# Patient Record
Sex: FEMALE | Race: WHITE | NOT HISPANIC OR LATINO | Employment: STUDENT | URBAN - METROPOLITAN AREA
[De-identification: names, ages, dates, MRNs, and addresses within clinical notes are randomized per-mention and may not be internally consistent; named-entity substitution may affect disease eponyms.]

---
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
## 2021-05-22 ENCOUNTER — IMMUNIZATION (OUTPATIENT)
Dept: PRIMARY CARE CLINIC | Facility: CLINIC | Age: 21
End: 2021-05-22
Payer: COMMERCIAL

## 2021-05-22 DIAGNOSIS — Z23 NEED FOR VACCINATION: Primary | ICD-10-CM

## 2021-05-22 PROCEDURE — 0001A COVID-19, MRNA, LNP-S, PF, 30 MCG/0.3 ML DOSE VACCINE: ICD-10-PCS | Mod: CV19,S$GLB,, | Performed by: INTERNAL MEDICINE

## 2021-05-22 PROCEDURE — 91300 COVID-19, MRNA, LNP-S, PF, 30 MCG/0.3 ML DOSE VACCINE: CPT | Mod: S$GLB,,, | Performed by: INTERNAL MEDICINE

## 2021-05-22 PROCEDURE — 91300 COVID-19, MRNA, LNP-S, PF, 30 MCG/0.3 ML DOSE VACCINE: ICD-10-PCS | Mod: S$GLB,,, | Performed by: INTERNAL MEDICINE

## 2021-05-22 PROCEDURE — 0001A COVID-19, MRNA, LNP-S, PF, 30 MCG/0.3 ML DOSE VACCINE: CPT | Mod: CV19,S$GLB,, | Performed by: INTERNAL MEDICINE

## 2021-06-12 ENCOUNTER — IMMUNIZATION (OUTPATIENT)
Dept: PRIMARY CARE CLINIC | Facility: CLINIC | Age: 21
End: 2021-06-12
Payer: COMMERCIAL

## 2021-06-12 DIAGNOSIS — Z23 NEED FOR VACCINATION: Primary | ICD-10-CM

## 2021-06-12 PROCEDURE — 0002A COVID-19, MRNA, LNP-S, PF, 30 MCG/0.3 ML DOSE VACCINE: CPT | Mod: CV19,S$GLB,, | Performed by: INTERNAL MEDICINE

## 2021-06-12 PROCEDURE — 91300 COVID-19, MRNA, LNP-S, PF, 30 MCG/0.3 ML DOSE VACCINE: CPT | Mod: S$GLB,,, | Performed by: INTERNAL MEDICINE

## 2021-06-12 PROCEDURE — 0002A COVID-19, MRNA, LNP-S, PF, 30 MCG/0.3 ML DOSE VACCINE: ICD-10-PCS | Mod: CV19,S$GLB,, | Performed by: INTERNAL MEDICINE

## 2021-06-12 PROCEDURE — 91300 COVID-19, MRNA, LNP-S, PF, 30 MCG/0.3 ML DOSE VACCINE: ICD-10-PCS | Mod: S$GLB,,, | Performed by: INTERNAL MEDICINE

## 2021-07-13 ENCOUNTER — LAB VISIT (OUTPATIENT)
Dept: LAB | Facility: HOSPITAL | Age: 21
End: 2021-07-13
Attending: INTERNAL MEDICINE
Payer: COMMERCIAL

## 2021-07-13 ENCOUNTER — OFFICE VISIT (OUTPATIENT)
Dept: FAMILY MEDICINE | Facility: CLINIC | Age: 21
End: 2021-07-13
Payer: COMMERCIAL

## 2021-07-13 VITALS
DIASTOLIC BLOOD PRESSURE: 70 MMHG | BODY MASS INDEX: 43 KG/M2 | HEIGHT: 62 IN | WEIGHT: 233.69 LBS | OXYGEN SATURATION: 98 % | TEMPERATURE: 99 F | HEART RATE: 102 BPM | SYSTOLIC BLOOD PRESSURE: 130 MMHG

## 2021-07-13 DIAGNOSIS — E28.2 PCOS (POLYCYSTIC OVARIAN SYNDROME): ICD-10-CM

## 2021-07-13 DIAGNOSIS — D50.9 IRON DEFICIENCY ANEMIA, UNSPECIFIED IRON DEFICIENCY ANEMIA TYPE: ICD-10-CM

## 2021-07-13 DIAGNOSIS — Z11.4 ENCOUNTER FOR SCREENING FOR HIV: ICD-10-CM

## 2021-07-13 DIAGNOSIS — R73.09 OTHER ABNORMAL GLUCOSE: ICD-10-CM

## 2021-07-13 DIAGNOSIS — R06.83 SNORING: ICD-10-CM

## 2021-07-13 DIAGNOSIS — G89.29 CHRONIC INTRACTABLE HEADACHE, UNSPECIFIED HEADACHE TYPE: ICD-10-CM

## 2021-07-13 DIAGNOSIS — G89.29 CHRONIC INTRACTABLE HEADACHE, UNSPECIFIED HEADACHE TYPE: Primary | ICD-10-CM

## 2021-07-13 DIAGNOSIS — R51.9 CHRONIC INTRACTABLE HEADACHE, UNSPECIFIED HEADACHE TYPE: ICD-10-CM

## 2021-07-13 DIAGNOSIS — R51.9 CHRONIC INTRACTABLE HEADACHE, UNSPECIFIED HEADACHE TYPE: Primary | ICD-10-CM

## 2021-07-13 LAB
ALBUMIN SERPL BCP-MCNC: 3.9 G/DL (ref 3.5–5.2)
ALP SERPL-CCNC: 46 U/L (ref 55–135)
ALT SERPL W/O P-5'-P-CCNC: 20 U/L (ref 10–44)
ANION GAP SERPL CALC-SCNC: 9 MMOL/L (ref 8–16)
AST SERPL-CCNC: 18 U/L (ref 10–40)
BILIRUB SERPL-MCNC: 0.2 MG/DL (ref 0.1–1)
BUN SERPL-MCNC: 8 MG/DL (ref 6–20)
CALCIUM SERPL-MCNC: 9.5 MG/DL (ref 8.7–10.5)
CHLORIDE SERPL-SCNC: 107 MMOL/L (ref 95–110)
CO2 SERPL-SCNC: 23 MMOL/L (ref 23–29)
CREAT SERPL-MCNC: 0.8 MG/DL (ref 0.5–1.4)
EST. GFR  (AFRICAN AMERICAN): >60 ML/MIN/1.73 M^2
EST. GFR  (NON AFRICAN AMERICAN): >60 ML/MIN/1.73 M^2
ESTIMATED AVG GLUCOSE: 103 MG/DL (ref 68–131)
FERRITIN SERPL-MCNC: 12 NG/ML (ref 20–300)
GLUCOSE SERPL-MCNC: 86 MG/DL (ref 70–110)
HBA1C MFR BLD: 5.2 % (ref 4–5.6)
IRON SERPL-MCNC: 51 UG/DL (ref 30–160)
POTASSIUM SERPL-SCNC: 4.4 MMOL/L (ref 3.5–5.1)
PROT SERPL-MCNC: 7.5 G/DL (ref 6–8.4)
SATURATED IRON: 11 % (ref 20–50)
SODIUM SERPL-SCNC: 139 MMOL/L (ref 136–145)
T4 FREE SERPL-MCNC: 1.03 NG/DL (ref 0.71–1.51)
TOTAL IRON BINDING CAPACITY: 463 UG/DL (ref 250–450)
TRANSFERRIN SERPL-MCNC: 313 MG/DL (ref 200–375)
TSH SERPL DL<=0.005 MIU/L-ACNC: 4.45 UIU/ML (ref 0.4–4)

## 2021-07-13 PROCEDURE — 82728 ASSAY OF FERRITIN: CPT | Performed by: INTERNAL MEDICINE

## 2021-07-13 PROCEDURE — 87389 HIV-1 AG W/HIV-1&-2 AB AG IA: CPT | Performed by: INTERNAL MEDICINE

## 2021-07-13 PROCEDURE — 1126F AMNT PAIN NOTED NONE PRSNT: CPT | Mod: S$GLB,,, | Performed by: INTERNAL MEDICINE

## 2021-07-13 PROCEDURE — 1126F PR PAIN SEVERITY QUANTIFIED, NO PAIN PRESENT: ICD-10-PCS | Mod: S$GLB,,, | Performed by: INTERNAL MEDICINE

## 2021-07-13 PROCEDURE — 99214 PR OFFICE/OUTPT VISIT, EST, LEVL IV, 30-39 MIN: ICD-10-PCS | Mod: S$GLB,,, | Performed by: INTERNAL MEDICINE

## 2021-07-13 PROCEDURE — 80053 COMPREHEN METABOLIC PANEL: CPT | Performed by: INTERNAL MEDICINE

## 2021-07-13 PROCEDURE — 83540 ASSAY OF IRON: CPT | Performed by: INTERNAL MEDICINE

## 2021-07-13 PROCEDURE — 84439 ASSAY OF FREE THYROXINE: CPT | Performed by: INTERNAL MEDICINE

## 2021-07-13 PROCEDURE — 83036 HEMOGLOBIN GLYCOSYLATED A1C: CPT | Performed by: INTERNAL MEDICINE

## 2021-07-13 PROCEDURE — 36415 COLL VENOUS BLD VENIPUNCTURE: CPT | Mod: PO | Performed by: INTERNAL MEDICINE

## 2021-07-13 PROCEDURE — 99999 PR PBB SHADOW E&M-EST. PATIENT-LVL IV: ICD-10-PCS | Mod: PBBFAC,,, | Performed by: INTERNAL MEDICINE

## 2021-07-13 PROCEDURE — 84443 ASSAY THYROID STIM HORMONE: CPT | Performed by: INTERNAL MEDICINE

## 2021-07-13 PROCEDURE — 99214 OFFICE O/P EST MOD 30 MIN: CPT | Mod: S$GLB,,, | Performed by: INTERNAL MEDICINE

## 2021-07-13 PROCEDURE — 99999 PR PBB SHADOW E&M-EST. PATIENT-LVL IV: CPT | Mod: PBBFAC,,, | Performed by: INTERNAL MEDICINE

## 2021-07-13 PROCEDURE — 3008F BODY MASS INDEX DOCD: CPT | Mod: CPTII,S$GLB,, | Performed by: INTERNAL MEDICINE

## 2021-07-13 PROCEDURE — 3008F PR BODY MASS INDEX (BMI) DOCUMENTED: ICD-10-PCS | Mod: CPTII,S$GLB,, | Performed by: INTERNAL MEDICINE

## 2021-07-13 PROCEDURE — 85025 COMPLETE CBC W/AUTO DIFF WBC: CPT | Performed by: INTERNAL MEDICINE

## 2021-07-13 RX ORDER — FLUORIDE (SODIUM) 1.1 %
PASTE (ML) DENTAL
COMMUNITY
Start: 2021-05-25 | End: 2022-02-07

## 2021-07-14 DIAGNOSIS — E61.1 IRON DEFICIENCY: ICD-10-CM

## 2021-07-14 DIAGNOSIS — R79.89 ABNORMAL TSH: Primary | ICD-10-CM

## 2021-07-14 LAB
BASOPHILS # BLD AUTO: 0.03 K/UL (ref 0–0.2)
BASOPHILS NFR BLD: 0.5 % (ref 0–1.9)
DIFFERENTIAL METHOD: ABNORMAL
EOSINOPHIL # BLD AUTO: 0.1 K/UL (ref 0–0.5)
EOSINOPHIL NFR BLD: 1.1 % (ref 0–8)
ERYTHROCYTE [DISTWIDTH] IN BLOOD BY AUTOMATED COUNT: 14.2 % (ref 11.5–14.5)
HCT VFR BLD AUTO: 41.1 % (ref 37–48.5)
HGB BLD-MCNC: 13 G/DL (ref 12–16)
IMM GRANULOCYTES # BLD AUTO: 0.02 K/UL (ref 0–0.04)
IMM GRANULOCYTES NFR BLD AUTO: 0.4 % (ref 0–0.5)
LYMPHOCYTES # BLD AUTO: 2.5 K/UL (ref 1–4.8)
LYMPHOCYTES NFR BLD: 45.4 % (ref 18–48)
MCH RBC QN AUTO: 27.3 PG (ref 27–31)
MCHC RBC AUTO-ENTMCNC: 31.6 G/DL (ref 32–36)
MCV RBC AUTO: 86 FL (ref 82–98)
MONOCYTES # BLD AUTO: 0.5 K/UL (ref 0.3–1)
MONOCYTES NFR BLD: 8.3 % (ref 4–15)
NEUTROPHILS # BLD AUTO: 2.5 K/UL (ref 1.8–7.7)
NEUTROPHILS NFR BLD: 44.3 % (ref 38–73)
NRBC BLD-RTO: 0 /100 WBC
PLATELET # BLD AUTO: 273 K/UL (ref 150–450)
PMV BLD AUTO: 10.3 FL (ref 9.2–12.9)
RBC # BLD AUTO: 4.76 M/UL (ref 4–5.4)
WBC # BLD AUTO: 5.55 K/UL (ref 3.9–12.7)

## 2021-07-16 LAB — HIV 1+2 AB+HIV1 P24 AG SERPL QL IA: NEGATIVE

## 2021-08-05 ENCOUNTER — HOSPITAL ENCOUNTER (OUTPATIENT)
Dept: SLEEP MEDICINE | Facility: HOSPITAL | Age: 21
Discharge: HOME | End: 2021-08-05
Attending: INTERNAL MEDICINE
Payer: COMMERCIAL

## 2021-08-05 DIAGNOSIS — R06.83 SNORING: ICD-10-CM

## 2021-08-05 PROCEDURE — 95800 SLP STDY UNATTENDED: CPT

## 2021-08-05 PROCEDURE — 95800 SLP STDY UNATTENDED: CPT | Mod: 26,,, | Performed by: INTERNAL MEDICINE

## 2021-08-05 PROCEDURE — 95800 PR SLEEP STUDY, UNATTENDED, RECORD HEART RATE/O2 SAT/RESP ANAL/SLEEP TIME: ICD-10-PCS | Mod: 26,,, | Performed by: INTERNAL MEDICINE

## 2021-08-16 ENCOUNTER — OFFICE VISIT (OUTPATIENT)
Dept: OBSTETRICS AND GYNECOLOGY | Facility: CLINIC | Age: 21
End: 2021-08-16
Payer: COMMERCIAL

## 2021-08-16 VITALS
DIASTOLIC BLOOD PRESSURE: 68 MMHG | WEIGHT: 232.13 LBS | BODY MASS INDEX: 42.72 KG/M2 | SYSTOLIC BLOOD PRESSURE: 124 MMHG | HEIGHT: 62 IN

## 2021-08-16 DIAGNOSIS — E28.2 PCOS (POLYCYSTIC OVARIAN SYNDROME): ICD-10-CM

## 2021-08-16 DIAGNOSIS — Z01.411 ENCOUNTER FOR WELL WOMAN EXAM WITH ABNORMAL FINDINGS: Primary | ICD-10-CM

## 2021-08-16 PROCEDURE — 99999 PR PBB SHADOW E&M-EST. PATIENT-LVL III: CPT | Mod: PBBFAC,,, | Performed by: OBSTETRICS & GYNECOLOGY

## 2021-08-16 PROCEDURE — 3044F HG A1C LEVEL LT 7.0%: CPT | Mod: CPTII,S$GLB,, | Performed by: OBSTETRICS & GYNECOLOGY

## 2021-08-16 PROCEDURE — 99395 PR PREVENTIVE VISIT,EST,18-39: ICD-10-PCS | Mod: S$GLB,,, | Performed by: OBSTETRICS & GYNECOLOGY

## 2021-08-16 PROCEDURE — 1126F AMNT PAIN NOTED NONE PRSNT: CPT | Mod: CPTII,S$GLB,, | Performed by: OBSTETRICS & GYNECOLOGY

## 2021-08-16 PROCEDURE — 3044F PR MOST RECENT HEMOGLOBIN A1C LEVEL <7.0%: ICD-10-PCS | Mod: CPTII,S$GLB,, | Performed by: OBSTETRICS & GYNECOLOGY

## 2021-08-16 PROCEDURE — 1126F PR PAIN SEVERITY QUANTIFIED, NO PAIN PRESENT: ICD-10-PCS | Mod: CPTII,S$GLB,, | Performed by: OBSTETRICS & GYNECOLOGY

## 2021-08-16 PROCEDURE — 1159F MED LIST DOCD IN RCRD: CPT | Mod: CPTII,S$GLB,, | Performed by: OBSTETRICS & GYNECOLOGY

## 2021-08-16 PROCEDURE — 3078F DIAST BP <80 MM HG: CPT | Mod: CPTII,S$GLB,, | Performed by: OBSTETRICS & GYNECOLOGY

## 2021-08-16 PROCEDURE — 3008F BODY MASS INDEX DOCD: CPT | Mod: CPTII,S$GLB,, | Performed by: OBSTETRICS & GYNECOLOGY

## 2021-08-16 PROCEDURE — 3074F SYST BP LT 130 MM HG: CPT | Mod: CPTII,S$GLB,, | Performed by: OBSTETRICS & GYNECOLOGY

## 2021-08-16 PROCEDURE — 99999 PR PBB SHADOW E&M-EST. PATIENT-LVL III: ICD-10-PCS | Mod: PBBFAC,,, | Performed by: OBSTETRICS & GYNECOLOGY

## 2021-08-16 PROCEDURE — 1159F PR MEDICATION LIST DOCUMENTED IN MEDICAL RECORD: ICD-10-PCS | Mod: CPTII,S$GLB,, | Performed by: OBSTETRICS & GYNECOLOGY

## 2021-08-16 PROCEDURE — 3078F PR MOST RECENT DIASTOLIC BLOOD PRESSURE < 80 MM HG: ICD-10-PCS | Mod: CPTII,S$GLB,, | Performed by: OBSTETRICS & GYNECOLOGY

## 2021-08-16 PROCEDURE — 3074F PR MOST RECENT SYSTOLIC BLOOD PRESSURE < 130 MM HG: ICD-10-PCS | Mod: CPTII,S$GLB,, | Performed by: OBSTETRICS & GYNECOLOGY

## 2021-08-16 PROCEDURE — 99395 PREV VISIT EST AGE 18-39: CPT | Mod: S$GLB,,, | Performed by: OBSTETRICS & GYNECOLOGY

## 2021-08-16 PROCEDURE — 3008F PR BODY MASS INDEX (BMI) DOCUMENTED: ICD-10-PCS | Mod: CPTII,S$GLB,, | Performed by: OBSTETRICS & GYNECOLOGY

## 2021-08-16 RX ORDER — NORGESTIMATE AND ETHINYL ESTRADIOL 0.25-0.035
1 KIT ORAL DAILY
Qty: 28 TABLET | Refills: 5 | Status: SHIPPED | OUTPATIENT
Start: 2021-08-16 | End: 2021-11-06 | Stop reason: SDUPTHER

## 2021-08-18 DIAGNOSIS — G44.52 NEW DAILY PERSISTENT HEADACHE: ICD-10-CM

## 2021-08-18 PROBLEM — G44.89 OTHER HEADACHE SYNDROME: Status: ACTIVE | Noted: 2019-05-28

## 2021-10-26 ENCOUNTER — PATIENT OUTREACH (OUTPATIENT)
Dept: ADMINISTRATIVE | Facility: OTHER | Age: 21
End: 2021-10-26

## 2021-10-27 ENCOUNTER — OFFICE VISIT (OUTPATIENT)
Dept: NEUROLOGY | Facility: CLINIC | Age: 21
End: 2021-10-27
Payer: COMMERCIAL

## 2021-10-27 VITALS
BODY MASS INDEX: 42.96 KG/M2 | DIASTOLIC BLOOD PRESSURE: 83 MMHG | HEART RATE: 94 BPM | SYSTOLIC BLOOD PRESSURE: 139 MMHG | HEIGHT: 62 IN | WEIGHT: 233.44 LBS

## 2021-10-27 DIAGNOSIS — G43.009 MIGRAINE WITHOUT AURA AND WITHOUT STATUS MIGRAINOSUS, NOT INTRACTABLE: Primary | ICD-10-CM

## 2021-10-27 PROCEDURE — 99999 PR PBB SHADOW E&M-EST. PATIENT-LVL III: ICD-10-PCS | Mod: PBBFAC,,, | Performed by: PSYCHIATRY & NEUROLOGY

## 2021-10-27 PROCEDURE — 3008F PR BODY MASS INDEX (BMI) DOCUMENTED: ICD-10-PCS | Mod: CPTII,S$GLB,, | Performed by: PSYCHIATRY & NEUROLOGY

## 2021-10-27 PROCEDURE — 3079F PR MOST RECENT DIASTOLIC BLOOD PRESSURE 80-89 MM HG: ICD-10-PCS | Mod: CPTII,S$GLB,, | Performed by: PSYCHIATRY & NEUROLOGY

## 2021-10-27 PROCEDURE — 1160F PR REVIEW ALL MEDS BY PRESCRIBER/CLIN PHARMACIST DOCUMENTED: ICD-10-PCS | Mod: CPTII,S$GLB,, | Performed by: PSYCHIATRY & NEUROLOGY

## 2021-10-27 PROCEDURE — 99205 PR OFFICE/OUTPT VISIT, NEW, LEVL V, 60-74 MIN: ICD-10-PCS | Mod: S$GLB,,, | Performed by: PSYCHIATRY & NEUROLOGY

## 2021-10-27 PROCEDURE — 3075F PR MOST RECENT SYSTOLIC BLOOD PRESS GE 130-139MM HG: ICD-10-PCS | Mod: CPTII,S$GLB,, | Performed by: PSYCHIATRY & NEUROLOGY

## 2021-10-27 PROCEDURE — 3008F BODY MASS INDEX DOCD: CPT | Mod: CPTII,S$GLB,, | Performed by: PSYCHIATRY & NEUROLOGY

## 2021-10-27 PROCEDURE — 3044F HG A1C LEVEL LT 7.0%: CPT | Mod: CPTII,S$GLB,, | Performed by: PSYCHIATRY & NEUROLOGY

## 2021-10-27 PROCEDURE — 3075F SYST BP GE 130 - 139MM HG: CPT | Mod: CPTII,S$GLB,, | Performed by: PSYCHIATRY & NEUROLOGY

## 2021-10-27 PROCEDURE — 3079F DIAST BP 80-89 MM HG: CPT | Mod: CPTII,S$GLB,, | Performed by: PSYCHIATRY & NEUROLOGY

## 2021-10-27 PROCEDURE — 1160F RVW MEDS BY RX/DR IN RCRD: CPT | Mod: CPTII,S$GLB,, | Performed by: PSYCHIATRY & NEUROLOGY

## 2021-10-27 PROCEDURE — 1159F PR MEDICATION LIST DOCUMENTED IN MEDICAL RECORD: ICD-10-PCS | Mod: CPTII,S$GLB,, | Performed by: PSYCHIATRY & NEUROLOGY

## 2021-10-27 PROCEDURE — 1159F MED LIST DOCD IN RCRD: CPT | Mod: CPTII,S$GLB,, | Performed by: PSYCHIATRY & NEUROLOGY

## 2021-10-27 PROCEDURE — 3044F PR MOST RECENT HEMOGLOBIN A1C LEVEL <7.0%: ICD-10-PCS | Mod: CPTII,S$GLB,, | Performed by: PSYCHIATRY & NEUROLOGY

## 2021-10-27 PROCEDURE — 99205 OFFICE O/P NEW HI 60 MIN: CPT | Mod: S$GLB,,, | Performed by: PSYCHIATRY & NEUROLOGY

## 2021-10-27 PROCEDURE — 99999 PR PBB SHADOW E&M-EST. PATIENT-LVL III: CPT | Mod: PBBFAC,,, | Performed by: PSYCHIATRY & NEUROLOGY

## 2021-10-27 RX ORDER — RIZATRIPTAN BENZOATE 10 MG/1
10 TABLET, ORALLY DISINTEGRATING ORAL
Qty: 9 TABLET | Refills: 5 | Status: SHIPPED | OUTPATIENT
Start: 2021-10-27 | End: 2021-12-08

## 2021-10-27 RX ORDER — TOPIRAMATE 25 MG/1
TABLET ORAL
Qty: 141 TABLET | Refills: 0 | Status: SHIPPED | OUTPATIENT
Start: 2021-10-27 | End: 2021-11-22

## 2021-11-15 ENCOUNTER — OFFICE VISIT (OUTPATIENT)
Dept: OBSTETRICS AND GYNECOLOGY | Facility: CLINIC | Age: 21
End: 2021-11-15
Payer: COMMERCIAL

## 2021-11-15 VITALS — BODY MASS INDEX: 43.35 KG/M2 | DIASTOLIC BLOOD PRESSURE: 90 MMHG | WEIGHT: 237 LBS | SYSTOLIC BLOOD PRESSURE: 142 MMHG

## 2021-11-15 DIAGNOSIS — E28.2 PCOS (POLYCYSTIC OVARIAN SYNDROME): ICD-10-CM

## 2021-11-15 DIAGNOSIS — Z30.41 ORAL CONTRACEPTIVE PILL SURVEILLANCE: Primary | ICD-10-CM

## 2021-11-15 DIAGNOSIS — R03.0 ELEVATED BP WITHOUT DIAGNOSIS OF HYPERTENSION: ICD-10-CM

## 2021-11-15 PROCEDURE — 1159F MED LIST DOCD IN RCRD: CPT | Mod: CPTII,S$GLB,, | Performed by: OBSTETRICS & GYNECOLOGY

## 2021-11-15 PROCEDURE — 3077F PR MOST RECENT SYSTOLIC BLOOD PRESSURE >= 140 MM HG: ICD-10-PCS | Mod: CPTII,S$GLB,, | Performed by: OBSTETRICS & GYNECOLOGY

## 2021-11-15 PROCEDURE — 3008F BODY MASS INDEX DOCD: CPT | Mod: CPTII,S$GLB,, | Performed by: OBSTETRICS & GYNECOLOGY

## 2021-11-15 PROCEDURE — 3044F HG A1C LEVEL LT 7.0%: CPT | Mod: CPTII,S$GLB,, | Performed by: OBSTETRICS & GYNECOLOGY

## 2021-11-15 PROCEDURE — 99213 PR OFFICE/OUTPT VISIT, EST, LEVL III, 20-29 MIN: ICD-10-PCS | Mod: S$GLB,,, | Performed by: OBSTETRICS & GYNECOLOGY

## 2021-11-15 PROCEDURE — 99999 PR PBB SHADOW E&M-EST. PATIENT-LVL III: CPT | Mod: PBBFAC,,, | Performed by: OBSTETRICS & GYNECOLOGY

## 2021-11-15 PROCEDURE — 99213 OFFICE O/P EST LOW 20 MIN: CPT | Mod: S$GLB,,, | Performed by: OBSTETRICS & GYNECOLOGY

## 2021-11-15 PROCEDURE — 3077F SYST BP >= 140 MM HG: CPT | Mod: CPTII,S$GLB,, | Performed by: OBSTETRICS & GYNECOLOGY

## 2021-11-15 PROCEDURE — 3008F PR BODY MASS INDEX (BMI) DOCUMENTED: ICD-10-PCS | Mod: CPTII,S$GLB,, | Performed by: OBSTETRICS & GYNECOLOGY

## 2021-11-15 PROCEDURE — 99999 PR PBB SHADOW E&M-EST. PATIENT-LVL III: ICD-10-PCS | Mod: PBBFAC,,, | Performed by: OBSTETRICS & GYNECOLOGY

## 2021-11-15 PROCEDURE — 3080F DIAST BP >= 90 MM HG: CPT | Mod: CPTII,S$GLB,, | Performed by: OBSTETRICS & GYNECOLOGY

## 2021-11-15 PROCEDURE — 1159F PR MEDICATION LIST DOCUMENTED IN MEDICAL RECORD: ICD-10-PCS | Mod: CPTII,S$GLB,, | Performed by: OBSTETRICS & GYNECOLOGY

## 2021-11-15 PROCEDURE — 3080F PR MOST RECENT DIASTOLIC BLOOD PRESSURE >= 90 MM HG: ICD-10-PCS | Mod: CPTII,S$GLB,, | Performed by: OBSTETRICS & GYNECOLOGY

## 2021-11-15 PROCEDURE — 3044F PR MOST RECENT HEMOGLOBIN A1C LEVEL <7.0%: ICD-10-PCS | Mod: CPTII,S$GLB,, | Performed by: OBSTETRICS & GYNECOLOGY

## 2021-11-15 RX ORDER — MEDROXYPROGESTERONE ACETATE 10 MG/1
TABLET ORAL
Qty: 10 TABLET | Refills: 6 | Status: SHIPPED | OUTPATIENT
Start: 2021-11-15 | End: 2021-12-08

## 2021-11-20 DIAGNOSIS — G43.009 MIGRAINE WITHOUT AURA AND WITHOUT STATUS MIGRAINOSUS, NOT INTRACTABLE: ICD-10-CM

## 2021-11-22 RX ORDER — TOPIRAMATE 50 MG/1
100 TABLET, FILM COATED ORAL 2 TIMES DAILY
Qty: 120 TABLET | Refills: 5 | Status: SHIPPED | OUTPATIENT
Start: 2021-11-22 | End: 2022-03-03 | Stop reason: SDUPTHER

## 2021-12-07 ENCOUNTER — PATIENT OUTREACH (OUTPATIENT)
Dept: ADMINISTRATIVE | Facility: OTHER | Age: 21
End: 2021-12-07

## 2021-12-08 ENCOUNTER — OFFICE VISIT (OUTPATIENT)
Dept: NEUROLOGY | Facility: CLINIC | Age: 21
End: 2021-12-08
Payer: COMMERCIAL

## 2021-12-08 DIAGNOSIS — G43.009 MIGRAINE WITHOUT AURA AND WITHOUT STATUS MIGRAINOSUS, NOT INTRACTABLE: Primary | ICD-10-CM

## 2021-12-08 PROCEDURE — 99214 OFFICE O/P EST MOD 30 MIN: CPT | Mod: 95,,, | Performed by: PSYCHIATRY & NEUROLOGY

## 2021-12-08 PROCEDURE — 99214 PR OFFICE/OUTPT VISIT, EST, LEVL IV, 30-39 MIN: ICD-10-PCS | Mod: 95,,, | Performed by: PSYCHIATRY & NEUROLOGY

## 2021-12-08 RX ORDER — SUMATRIPTAN SUCCINATE 100 MG/1
100 TABLET ORAL
Qty: 12 TABLET | Refills: 5 | Status: SHIPPED | OUTPATIENT
Start: 2021-12-08 | End: 2022-03-22

## 2021-12-22 ENCOUNTER — PATIENT MESSAGE (OUTPATIENT)
Dept: NEUROLOGY | Facility: CLINIC | Age: 21
End: 2021-12-22

## 2021-12-22 DIAGNOSIS — G43.009 MIGRAINE WITHOUT AURA AND WITHOUT STATUS MIGRAINOSUS, NOT INTRACTABLE: Primary | ICD-10-CM

## 2021-12-22 RX ORDER — PROPRANOLOL HYDROCHLORIDE 10 MG/1
10 TABLET ORAL 2 TIMES DAILY
Qty: 60 TABLET | Refills: 5 | Status: SHIPPED | OUTPATIENT
Start: 2021-12-22 | End: 2022-03-22

## 2022-01-05 ENCOUNTER — PATIENT MESSAGE (OUTPATIENT)
Dept: OBSTETRICS AND GYNECOLOGY | Facility: CLINIC | Age: 22
End: 2022-01-05

## 2022-01-05 ENCOUNTER — TELEPHONE (OUTPATIENT)
Dept: OBSTETRICS AND GYNECOLOGY | Facility: CLINIC | Age: 22
End: 2022-01-05

## 2022-01-06 ENCOUNTER — PATIENT MESSAGE (OUTPATIENT)
Dept: NEUROLOGY | Facility: CLINIC | Age: 22
End: 2022-01-06
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
## 2022-02-04 ENCOUNTER — OFFICE VISIT (OUTPATIENT)
Dept: FAMILY MEDICINE | Facility: CLINIC | Age: 22
End: 2022-02-04
Payer: COMMERCIAL

## 2022-02-04 VITALS
SYSTOLIC BLOOD PRESSURE: 124 MMHG | HEIGHT: 62 IN | OXYGEN SATURATION: 97 % | HEART RATE: 91 BPM | WEIGHT: 240 LBS | BODY MASS INDEX: 44.16 KG/M2 | DIASTOLIC BLOOD PRESSURE: 72 MMHG | TEMPERATURE: 98 F

## 2022-02-04 DIAGNOSIS — H72.91 TYMPANIC MEMBRANE RUPTURE, RIGHT: Primary | ICD-10-CM

## 2022-02-04 DIAGNOSIS — E28.2 PCOS (POLYCYSTIC OVARIAN SYNDROME): ICD-10-CM

## 2022-02-04 DIAGNOSIS — Z11.8 SCREENING FOR CHLAMYDIAL DISEASE: ICD-10-CM

## 2022-02-04 DIAGNOSIS — F41.9 ANXIETY: ICD-10-CM

## 2022-02-04 DIAGNOSIS — Z23 NEEDS FLU SHOT: ICD-10-CM

## 2022-02-04 DIAGNOSIS — E66.01 MORBID OBESITY: ICD-10-CM

## 2022-02-04 PROCEDURE — 90686 FLU VACCINE (QUAD) GREATER THAN OR EQUAL TO 3YO PRESERVATIVE FREE IM: ICD-10-PCS | Mod: S$GLB,,, | Performed by: INTERNAL MEDICINE

## 2022-02-04 PROCEDURE — 90471 IMMUNIZATION ADMIN: CPT | Mod: S$GLB,,, | Performed by: INTERNAL MEDICINE

## 2022-02-04 PROCEDURE — 90686 IIV4 VACC NO PRSV 0.5 ML IM: CPT | Mod: S$GLB,,, | Performed by: INTERNAL MEDICINE

## 2022-02-04 PROCEDURE — 3008F BODY MASS INDEX DOCD: CPT | Mod: CPTII,S$GLB,, | Performed by: INTERNAL MEDICINE

## 2022-02-04 PROCEDURE — 3078F PR MOST RECENT DIASTOLIC BLOOD PRESSURE < 80 MM HG: ICD-10-PCS | Mod: CPTII,S$GLB,, | Performed by: INTERNAL MEDICINE

## 2022-02-04 PROCEDURE — 1159F PR MEDICATION LIST DOCUMENTED IN MEDICAL RECORD: ICD-10-PCS | Mod: CPTII,S$GLB,, | Performed by: INTERNAL MEDICINE

## 2022-02-04 PROCEDURE — 99214 PR OFFICE/OUTPT VISIT, EST, LEVL IV, 30-39 MIN: ICD-10-PCS | Mod: 25,S$GLB,, | Performed by: INTERNAL MEDICINE

## 2022-02-04 PROCEDURE — 90471 FLU VACCINE (QUAD) GREATER THAN OR EQUAL TO 3YO PRESERVATIVE FREE IM: ICD-10-PCS | Mod: S$GLB,,, | Performed by: INTERNAL MEDICINE

## 2022-02-04 PROCEDURE — 99999 PR PBB SHADOW E&M-EST. PATIENT-LVL IV: CPT | Mod: PBBFAC,,, | Performed by: INTERNAL MEDICINE

## 2022-02-04 PROCEDURE — 99214 OFFICE O/P EST MOD 30 MIN: CPT | Mod: 25,S$GLB,, | Performed by: INTERNAL MEDICINE

## 2022-02-04 PROCEDURE — 3074F PR MOST RECENT SYSTOLIC BLOOD PRESSURE < 130 MM HG: ICD-10-PCS | Mod: CPTII,S$GLB,, | Performed by: INTERNAL MEDICINE

## 2022-02-04 PROCEDURE — 3078F DIAST BP <80 MM HG: CPT | Mod: CPTII,S$GLB,, | Performed by: INTERNAL MEDICINE

## 2022-02-04 PROCEDURE — 1160F PR REVIEW ALL MEDS BY PRESCRIBER/CLIN PHARMACIST DOCUMENTED: ICD-10-PCS | Mod: CPTII,S$GLB,, | Performed by: INTERNAL MEDICINE

## 2022-02-04 PROCEDURE — 1159F MED LIST DOCD IN RCRD: CPT | Mod: CPTII,S$GLB,, | Performed by: INTERNAL MEDICINE

## 2022-02-04 PROCEDURE — 1160F RVW MEDS BY RX/DR IN RCRD: CPT | Mod: CPTII,S$GLB,, | Performed by: INTERNAL MEDICINE

## 2022-02-04 PROCEDURE — 99999 PR PBB SHADOW E&M-EST. PATIENT-LVL IV: ICD-10-PCS | Mod: PBBFAC,,, | Performed by: INTERNAL MEDICINE

## 2022-02-04 PROCEDURE — 3074F SYST BP LT 130 MM HG: CPT | Mod: CPTII,S$GLB,, | Performed by: INTERNAL MEDICINE

## 2022-02-04 PROCEDURE — 3008F PR BODY MASS INDEX (BMI) DOCUMENTED: ICD-10-PCS | Mod: CPTII,S$GLB,, | Performed by: INTERNAL MEDICINE

## 2022-02-04 RX ORDER — AMOXICILLIN AND CLAVULANATE POTASSIUM 875; 125 MG/1; MG/1
1 TABLET, FILM COATED ORAL 2 TIMES DAILY
Qty: 20 TABLET | Refills: 0 | Status: SHIPPED | OUTPATIENT
Start: 2022-02-04 | End: 2022-02-14

## 2022-02-04 NOTE — PROGRESS NOTES
Patient given flu injection. Tolerated well. VIS given & advised to wait in lobby 15 mins. Verbalized understanding.

## 2022-02-04 NOTE — PATIENT INSTRUCTIONS
The [facility] Psych[facility] Department requires that patients call and make their own appointments.  Please see the below phone numbers:    [facility] - [place]: [phone]  Providers:   MD Adore Leon PA-C Cassie Block [facility]    Main [place]:  [facility] - [phone]      Medications for anxiety  Consider sertraline (zoloft).
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
## 2022-02-07 ENCOUNTER — TELEPHONE (OUTPATIENT)
Dept: FAMILY MEDICINE | Facility: CLINIC | Age: 22
End: 2022-02-07

## 2022-02-07 DIAGNOSIS — Z11.8 SCREENING FOR CHLAMYDIAL DISEASE: Primary | ICD-10-CM

## 2022-02-07 NOTE — TELEPHONE ENCOUNTER
Spoken with clinical service. Was told that the order for the GC and Chlamydia was in the wrong specimen. Stated that it needs to be in a regular urine cup.    good, to achieve stated therapy goals

## 2022-02-07 NOTE — TELEPHONE ENCOUNTER
Please call pt Jesús clemons for the inconvenience but they were unable to run the urine test.  Would she mind coming in and checking in downstairs for repeat urine sample?

## 2022-02-07 NOTE — TELEPHONE ENCOUNTER
----- Message from Cecy López sent at 2/6/2022  3:38 AM CST -----  Regarding: Lab Client Services  Contact: [phone]  Good Morning,     My name is Cecy López I work in the Lab Client Services. We had a problem with some lab work on this patient. If someone from your office could call us at [phone] or ext. [phone] that would be great. Anyone in my department can help.      Thank you

## 2022-02-09 ENCOUNTER — OFFICE VISIT (OUTPATIENT)
Dept: OBSTETRICS AND GYNECOLOGY | Facility: CLINIC | Age: 22
End: 2022-02-09
Payer: COMMERCIAL

## 2022-02-09 VITALS
DIASTOLIC BLOOD PRESSURE: 94 MMHG | BODY MASS INDEX: 43.31 KG/M2 | SYSTOLIC BLOOD PRESSURE: 160 MMHG | WEIGHT: 236.75 LBS

## 2022-02-09 DIAGNOSIS — E28.2 PCOS (POLYCYSTIC OVARIAN SYNDROME): Primary | ICD-10-CM

## 2022-02-09 PROCEDURE — 3008F BODY MASS INDEX DOCD: CPT | Mod: CPTII,S$GLB,, | Performed by: OBSTETRICS & GYNECOLOGY

## 2022-02-09 PROCEDURE — 3080F PR MOST RECENT DIASTOLIC BLOOD PRESSURE >= 90 MM HG: ICD-10-PCS | Mod: CPTII,S$GLB,, | Performed by: OBSTETRICS & GYNECOLOGY

## 2022-02-09 PROCEDURE — 99999 PR PBB SHADOW E&M-EST. PATIENT-LVL II: ICD-10-PCS | Mod: PBBFAC,,, | Performed by: OBSTETRICS & GYNECOLOGY

## 2022-02-09 PROCEDURE — 99213 PR OFFICE/OUTPT VISIT, EST, LEVL III, 20-29 MIN: ICD-10-PCS | Mod: S$GLB,,, | Performed by: OBSTETRICS & GYNECOLOGY

## 2022-02-09 PROCEDURE — 99213 OFFICE O/P EST LOW 20 MIN: CPT | Mod: S$GLB,,, | Performed by: OBSTETRICS & GYNECOLOGY

## 2022-02-09 PROCEDURE — 3077F PR MOST RECENT SYSTOLIC BLOOD PRESSURE >= 140 MM HG: ICD-10-PCS | Mod: CPTII,S$GLB,, | Performed by: OBSTETRICS & GYNECOLOGY

## 2022-02-09 PROCEDURE — 3008F PR BODY MASS INDEX (BMI) DOCUMENTED: ICD-10-PCS | Mod: CPTII,S$GLB,, | Performed by: OBSTETRICS & GYNECOLOGY

## 2022-02-09 PROCEDURE — 3077F SYST BP >= 140 MM HG: CPT | Mod: CPTII,S$GLB,, | Performed by: OBSTETRICS & GYNECOLOGY

## 2022-02-09 PROCEDURE — 99999 PR PBB SHADOW E&M-EST. PATIENT-LVL II: CPT | Mod: PBBFAC,,, | Performed by: OBSTETRICS & GYNECOLOGY

## 2022-02-09 PROCEDURE — 3080F DIAST BP >= 90 MM HG: CPT | Mod: CPTII,S$GLB,, | Performed by: OBSTETRICS & GYNECOLOGY

## 2022-02-09 RX ORDER — MEDROXYPROGESTERONE ACETATE 10 MG/1
10 TABLET ORAL DAILY
Qty: 10 TABLET | Refills: 11 | Status: SHIPPED | OUTPATIENT
Start: 2022-02-09 | End: 2022-03-20 | Stop reason: SDUPTHER

## 2022-02-09 NOTE — PROGRESS NOTES
SUBJECTIVE:   21 y.o. female   for medication f/u    Patient's last menstrual period was 12/15/2021 (approximate)..      pt with pcos  NELDA was started but start to have elevated BP  She was also started on topamax for HA  Now taking provera for cycle induction  She had an induced cycle from provera in 12/15/21  BP elevated again today, however was normal when she went to see her PCP last week      OB History    Para Term  AB Living   0 0 0 0 0 0   SAB IAB Ectopic Multiple Live Births   0 0 0 0 0   Obstetric Comments   Gynhx; 11/irregular every 1-24 months   PCOS - elevated testosterone/irregular menses   Not sexually active     Past Medical History:   Diagnosis Date    Allergy     Asthma     Labial adhesions     History    Otitis media     Recurrent     Past Surgical History:   Procedure Laterality Date    ADENOIDECTOMY      TONSILLECTOMY      TYMPANOSTOMY TUBE PLACEMENT      X2     Social History     Socioeconomic History    Marital status: Single   Tobacco Use    Smoking status: Never Smoker    Smokeless tobacco: Never Used   Substance and Sexual Activity    Alcohol use: No    Drug use: No    Sexual activity: Never   Social History Narrative    In 6th grade     Family History   Problem Relation Age of Onset    Hypertension Mother     Hypertension Father     Hypertension Maternal Grandmother          Current Outpatient Medications   Medication Sig Dispense Refill    amoxicillin-clavulanate 875-125mg (AUGMENTIN) 875-125 mg per tablet Take 1 tablet by mouth 2 (two) times daily. for 10 days 20 tablet 0    propranoloL (INDERAL) 10 MG tablet Take 1 tablet (10 mg total) by mouth 2 (two) times daily. 60 tablet 5    sumatriptan (IMITREX) 100 MG tablet Take 1 tablet (100 mg total) by mouth every 2 (two) hours as needed for Migraine. Max 2 tablets (200mg) in 24hrs. 12 tablet 5    topiramate (TOPAMAX) 50 MG tablet Take 2 tablets (100 mg total) by mouth 2 (two) times daily. 120 tablet 5      No current facility-administered medications for this visit.     Allergies: Patient has no known allergies.       ROS  ROS:  GENERAL: Denies weight gain or weight loss. Feeling well overall.   SKIN: Denies rash or lesions.   HEAD: Denies head injury or headache.   NODES: Denies enlarged lymph nodes.   CHEST: Denies chest pain or shortness of breath.   CARDIOVASCULAR: Denies palpitations or left sided chest pain.   ABDOMEN: No abdominal pain, constipation, diarrhea, nausea, vomiting or rectal bleeding.   URINARY: No frequency, dysuria, hematuria, or burning on urination.  REPRODUCTIVE: Denies vaginal discharge, abnormal vaginal bleeding, lesions, pelvic pain  BREASTS: The patient performs breast self-examination and denies pain, lumps, or nipple discharge.   HEMATOLOGIC: No easy bruisability or excessive bleeding.  MUSCULOSKELETAL: Denies joint pain or swelling.   NEUROLOGIC: Denies syncope or weakness.   PSYCHIATRIC: Denies depression, anxiety or mood swings.      OBJECTIVE:   BP (!) 160/94   Wt 107.4 kg (236 lb 12.4 oz)   LMP 12/15/2021 (Approximate)   BMI 43.31 kg/m²    Repeat 150/90  The patient appears well, alert, oriented x 3, in no distress.  NECK: negative, no thyromegaly, trachea midline  SKIN: normal, good color and good turgor      ASSESSMENT:   1.  PCOS:  At risk for metabolic syndrome, discussed exercise, healthy diet and weight lost  2.  Elevated bp: monitor BP at home and f/u with pcp  3.  Continue provera for cycle induction every 3 months if normal cycles do not resume  Advised pt this pt is not a contraception, she is currently not sexually active  4.  rtc in 1 year

## 2022-02-10 ENCOUNTER — TELEPHONE (OUTPATIENT)
Dept: FAMILY MEDICINE | Facility: CLINIC | Age: 22
End: 2022-02-10

## 2022-02-10 NOTE — TELEPHONE ENCOUNTER
Patient requesting to switch providers due to not working out with Kimberly Aguayo       See previous

## 2022-02-10 NOTE — TELEPHONE ENCOUNTER
Below information given to patient, verbalized   understanding. States will come back to give sample

## 2022-02-10 NOTE — TELEPHONE ENCOUNTER
----- Message from Saige Lane sent at 2/9/2022 12:04 PM CST -----  Type:  Patient Returning Call    Who Called: self    Who Left Message for Patient: Judith    Does the patient know what this is regarding?: yes    Would the patient rather a call back or a response via [facility]? call    Best Call Back Number:.[phone] (home)

## 2022-03-07 ENCOUNTER — CLINICAL SUPPORT (OUTPATIENT)
Dept: AUDIOLOGY | Facility: CLINIC | Age: 22
End: 2022-03-07
Payer: COMMERCIAL

## 2022-03-07 DIAGNOSIS — H90.11 CONDUCTIVE HEARING LOSS OF RIGHT EAR WITH UNRESTRICTED HEARING OF LEFT EAR: Primary | ICD-10-CM

## 2022-03-07 PROCEDURE — 92557 COMPREHENSIVE HEARING TEST: CPT | Mod: S$GLB,,, | Performed by: AUDIOLOGIST

## 2022-03-07 PROCEDURE — 92567 PR TYMPA2METRY: ICD-10-PCS | Mod: S$GLB,,, | Performed by: AUDIOLOGIST

## 2022-03-07 PROCEDURE — 92557 PR COMPREHENSIVE HEARING TEST: ICD-10-PCS | Mod: S$GLB,,, | Performed by: AUDIOLOGIST

## 2022-03-07 PROCEDURE — 92567 TYMPANOMETRY: CPT | Mod: S$GLB,,, | Performed by: AUDIOLOGIST

## 2022-03-07 NOTE — PROGRESS NOTES
3/7/2022    AUDIOLOGICAL EVALUATION:    Sherita Alejandra was seen for an audiological evaluation on 3/7/2022 for decreased hearing in the right ear.  Sherita reported a history of PE tubes and ruptured eardrum in December 2021.      Pure tone threshold testing revealed a mild conductive hearing loss for the right ear with unrestricted hearing in the left ear.  Speech reception thresholds were obtained at 20 dBHL for the right ear and 10 dBHL for the left ear.  Speech discrimination scores were obtained at 100% for the right ear and 100% for the left ear.    Tympanometry revealed a Type A tympanogram with a large PVT noted for the right ear and a Type A tympanogram for the left ear with a normal PVT for the left ear.    Recommend:  1.  Otologic evaluation.  2.  Follow up audio as needed.

## 2022-03-10 ENCOUNTER — OFFICE VISIT (OUTPATIENT)
Dept: OTOLARYNGOLOGY | Facility: CLINIC | Age: 22
End: 2022-03-10
Payer: COMMERCIAL

## 2022-03-10 VITALS
SYSTOLIC BLOOD PRESSURE: 147 MMHG | BODY MASS INDEX: 46.53 KG/M2 | DIASTOLIC BLOOD PRESSURE: 85 MMHG | WEIGHT: 237 LBS | HEIGHT: 60 IN

## 2022-03-10 DIAGNOSIS — H90.11 CONDUCTIVE HEARING LOSS OF RIGHT EAR WITH UNRESTRICTED HEARING OF LEFT EAR: Primary | ICD-10-CM

## 2022-03-10 DIAGNOSIS — H72.91 TYMPANIC MEMBRANE RUPTURE, RIGHT: ICD-10-CM

## 2022-03-10 PROCEDURE — 3008F BODY MASS INDEX DOCD: CPT | Mod: CPTII,S$GLB,, | Performed by: OTOLARYNGOLOGY

## 2022-03-10 PROCEDURE — 99204 OFFICE O/P NEW MOD 45 MIN: CPT | Mod: 57,S$GLB,, | Performed by: OTOLARYNGOLOGY

## 2022-03-10 PROCEDURE — 3079F DIAST BP 80-89 MM HG: CPT | Mod: CPTII,S$GLB,, | Performed by: OTOLARYNGOLOGY

## 2022-03-10 PROCEDURE — 3008F PR BODY MASS INDEX (BMI) DOCUMENTED: ICD-10-PCS | Mod: CPTII,S$GLB,, | Performed by: OTOLARYNGOLOGY

## 2022-03-10 PROCEDURE — 3079F PR MOST RECENT DIASTOLIC BLOOD PRESSURE 80-89 MM HG: ICD-10-PCS | Mod: CPTII,S$GLB,, | Performed by: OTOLARYNGOLOGY

## 2022-03-10 PROCEDURE — 3077F PR MOST RECENT SYSTOLIC BLOOD PRESSURE >= 140 MM HG: ICD-10-PCS | Mod: CPTII,S$GLB,, | Performed by: OTOLARYNGOLOGY

## 2022-03-10 PROCEDURE — 1159F PR MEDICATION LIST DOCUMENTED IN MEDICAL RECORD: ICD-10-PCS | Mod: CPTII,S$GLB,, | Performed by: OTOLARYNGOLOGY

## 2022-03-10 PROCEDURE — 3077F SYST BP >= 140 MM HG: CPT | Mod: CPTII,S$GLB,, | Performed by: OTOLARYNGOLOGY

## 2022-03-10 PROCEDURE — 99204 PR OFFICE/OUTPT VISIT, NEW, LEVL IV, 45-59 MIN: ICD-10-PCS | Mod: 57,S$GLB,, | Performed by: OTOLARYNGOLOGY

## 2022-03-10 PROCEDURE — 1159F MED LIST DOCD IN RCRD: CPT | Mod: CPTII,S$GLB,, | Performed by: OTOLARYNGOLOGY

## 2022-03-10 NOTE — PROGRESS NOTES
Subjective:       Patient ID: Sherita Alejandra is a 21 y.o. female.    Chief Complaint: tympanic membrane rupture (Right ear w/ hearing test.)    HPI     Sherita Alejandra is a 21 y.o. female presents for evaluation of right tympanic membrane perforation.  Patient thinks has been present for 3 months.  She reports a history of repeated perforations in that ear.  She reports a history of tympanostomy tubes as a child.  Denies chronic ear infections or otorrhea.  Notes mild ear fullness and popping occasionally.    Review of Systems   Constitutional: Negative.    HENT: Positive for ear pain and hearing loss.    Eyes: Negative.    Respiratory: Negative.    Cardiovascular: Negative.    Gastrointestinal: Negative.    Endocrine: Negative.    Genitourinary: Negative.    Musculoskeletal: Negative.    Integumentary:  Negative.   Allergic/Immunologic: Negative.    Neurological: Positive for headaches.   Hematological: Negative.    Psychiatric/Behavioral: Negative.          Objective:      Physical Exam  Vitals and nursing note reviewed.   Constitutional:       Appearance: Normal appearance.   HENT:      Head: Normocephalic and atraumatic.      Right Ear: Ear canal and external ear normal. There is no impacted cerumen. Tympanic membrane is perforated (Dry anterior central perforation just adjacent to the malleus approximately 25-30% the drum.).      Left Ear: Tympanic membrane, ear canal and external ear normal. There is no impacted cerumen.      Ears:        Comments: Ear canal normal size with view of annulus.  Neurological:      Mental Status: She is alert.       data reviewed:        Audiogram tracings independently reviewed by me shows normal hearing of left ear.  Right side with mild conductive hearing loss and type a tympanogram.      Assessment:       Problem List Items Addressed This Visit    None     Visit Diagnoses     Conductive hearing loss of right ear with unrestricted hearing of left ear    -  Primary     Tympanic membrane rupture, right              Plan:         in summary this is a 21-year-old female with right tympanic membrane perforation.     Discussed right Tympanoplasty with patient r Risks, complications, alternatives and goals reviewed including possible infection, bleeding, hearing loss, chronic drainage, graft failure, facial nerve problems, dural injury and other potential problems.      Patient to discuss with her mother and will call back to schedule surgery if interested.  Answers for HPI/ROS submitted by the patient on 3/10/2022  Ear infection(s)?: Yes

## 2022-03-11 ENCOUNTER — PATIENT MESSAGE (OUTPATIENT)
Dept: FAMILY MEDICINE | Facility: CLINIC | Age: 22
End: 2022-03-11

## 2022-03-11 ENCOUNTER — PATIENT MESSAGE (OUTPATIENT)
Dept: NEUROLOGY | Facility: CLINIC | Age: 22
End: 2022-03-11

## 2022-03-11 ENCOUNTER — PATIENT MESSAGE (OUTPATIENT)
Dept: OTOLARYNGOLOGY | Facility: CLINIC | Age: 22
End: 2022-03-11

## 2022-03-14 ENCOUNTER — TELEPHONE (OUTPATIENT)
Dept: OTOLARYNGOLOGY | Facility: CLINIC | Age: 22
End: 2022-03-14

## 2022-03-14 DIAGNOSIS — H90.11 CONDUCTIVE HEARING LOSS OF RIGHT EAR WITH UNRESTRICTED HEARING OF LEFT EAR: ICD-10-CM

## 2022-03-14 DIAGNOSIS — H72.91 TYMPANIC MEMBRANE RUPTURE, RIGHT: Primary | ICD-10-CM

## 2022-03-22 ENCOUNTER — OFFICE VISIT (OUTPATIENT)
Dept: NEUROLOGY | Facility: CLINIC | Age: 22
End: 2022-03-22
Payer: COMMERCIAL

## 2022-03-22 DIAGNOSIS — G43.009 MIGRAINE WITHOUT AURA AND WITHOUT STATUS MIGRAINOSUS, NOT INTRACTABLE: Primary | ICD-10-CM

## 2022-03-22 PROCEDURE — 99214 PR OFFICE/OUTPT VISIT, EST, LEVL IV, 30-39 MIN: ICD-10-PCS | Mod: 95,,, | Performed by: PSYCHIATRY & NEUROLOGY

## 2022-03-22 PROCEDURE — 1159F PR MEDICATION LIST DOCUMENTED IN MEDICAL RECORD: ICD-10-PCS | Mod: CPTII,95,, | Performed by: PSYCHIATRY & NEUROLOGY

## 2022-03-22 PROCEDURE — 1160F RVW MEDS BY RX/DR IN RCRD: CPT | Mod: CPTII,95,, | Performed by: PSYCHIATRY & NEUROLOGY

## 2022-03-22 PROCEDURE — 99214 OFFICE O/P EST MOD 30 MIN: CPT | Mod: 95,,, | Performed by: PSYCHIATRY & NEUROLOGY

## 2022-03-22 PROCEDURE — 1159F MED LIST DOCD IN RCRD: CPT | Mod: CPTII,95,, | Performed by: PSYCHIATRY & NEUROLOGY

## 2022-03-22 PROCEDURE — 1160F PR REVIEW ALL MEDS BY PRESCRIBER/CLIN PHARMACIST DOCUMENTED: ICD-10-PCS | Mod: CPTII,95,, | Performed by: PSYCHIATRY & NEUROLOGY

## 2022-03-22 RX ORDER — DULOXETIN HYDROCHLORIDE 20 MG/1
20 CAPSULE, DELAYED RELEASE ORAL DAILY
Qty: 30 CAPSULE | Refills: 11 | Status: SHIPPED | OUTPATIENT
Start: 2022-03-22 | End: 2022-04-25

## 2022-03-22 RX ORDER — TOPIRAMATE 100 MG/1
100 TABLET, FILM COATED ORAL 2 TIMES DAILY
Qty: 60 TABLET | Refills: 11 | Status: SHIPPED | OUTPATIENT
Start: 2022-03-22 | End: 2022-11-22 | Stop reason: SDUPTHER

## 2022-03-22 RX ORDER — SUMATRIPTAN SUCCINATE 100 MG/1
100 TABLET ORAL
Qty: 9 TABLET | Refills: 11 | Status: SHIPPED | OUTPATIENT
Start: 2022-03-22 | End: 2023-04-04

## 2022-03-22 NOTE — PROGRESS NOTES
NEUROLOGY HEADACHE NOTE?     CHIEF COMPLAINT?   Ms. Sherita Alejandra chief concern is follow-up for migraine.     TELEPHONE ENCOUNTER  Patient verbally confirmed being in the [facility] at the time of the encounter  Provider was located in the Neurology office at [facility].     Subjective:    03/22/2022:  Patient reports that she has no new symptoms but has returned to previous baseline in frequency since previous encounter.  She highlights life stressors as making her headaches worse.  15-20 total headache days with 6 severe/migrainous days in the last month.  Continues on topiramate 100 mg twice daily with no noted side effects.  She was unable to tolerate propranolol and has discontinued its use.  She presents today to discuss next steps in therapy.  No new medical or neurological complaints; no new headache features or concerns.    ===========================================  12/08/21: Sherita presents today via telemedicine for follow-up regarding migraines.  She notes that she has been quite stable since previous encounter and has seen some improvement in overall headache frequency.  She estimates 10 total headache days in the last month where 6 were rated as severe episodes.    She has been able to titrate topiramate to 125 mg once daily.  No bothersome side effects noted with the medication but admits to occasional face tingling.  She has employed rizatriptan only once where she noted that it was completely unhelpful.  But no side effects.  We discussed that she is currently in the process of changing her regular birth control regimen due to blood pressure issues.  We specifically discussed birth control as it relates to topiramate use.  She was strongly encouraged immediately to employ other safe sex practices such as condoms while transitioning other birth control measures.  No new headache features or new neurological complaints at this time.    10/27/2021 HPI:  Ms. Alejandra presents today  to discuss her ongoing uncontrolled headaches.  Of note, the patient has been evaluated by outside provider who ordered MRI brain which has not been completed.  Patient and her mother acknowledged disorder and stated that they would call to schedule and proceed with the scan.    Age of onset: 8 YO   When did they become more of a problem: about one year ago  # of Total headache days per month: 15-20  # of Migraine days per month: 6  Intensity of average and most severe headaches: 4-5/10, 9/10  Duration of headache pain: some are short 1-2hr less severe episodes; bad episodes can be >4hrs or all day  Quality of pain: Pulsating/Throbbing, pressure, stabbing  Laterality: bilateral  Location: frontal midline with radiation backwards  Photophobia and phonophobia: light, not much sound  Nausea and vomiting: nausea, no vomiting  Causes avoidance of physical activity: yes  Worsened by physical activity: no  Triggers: none  Relieves: sleep  Preventive Medications failed: none  Acute medications failed: none  Therapies tried: none  Supplements: iron pill  OTC Medications: ibuprofen, tylenol (5-6 days per month, or less)  Hx of (Bolded items are positive): depression, anxiety, fibromyalgia, autoimmune disorder, head trauma, neurological infection, glaucoma, asthma, nephrolithiasis  Is medication overuse contributing to the patient's current migraine burden: no  Aura: none  Autonomic symptoms: none    Family Hx of migraines: mother     Recent negative sleep study. Recent eye exam noted to be normal.    Latest Imaging: mri pending scheduling from outside provider  ===========================================    CURRENT MEDS:  Current Outpatient Medications   Medication Sig Dispense Refill    medroxyPROGESTERone (PROVERA) 10 MG tablet Take 1 tablet (10 mg total) by mouth once daily. 10 tablet 11    propranoloL (INDERAL) 10 MG tablet Take 1 tablet (10 mg total) by mouth 2 (two) times daily. 60 tablet 5    sumatriptan (IMITREX)  100 MG tablet Take 1 tablet (100 mg total) by mouth every 2 (two) hours as needed for Migraine. Max 2 tablets (200mg) in 24hrs. 12 tablet 5    topiramate (TOPAMAX) 50 MG tablet Take 2 tablets (100 mg total) by mouth 2 (two) times daily. 120 tablet 5     No current facility-administered medications for this visit.     ALLERGIES:  Review of patient's allergies indicates:  No Known Allergies    Objective:  PHYSICAL EXAMINATION??     NO VITAL SIGNS OR PHYSICAL EXAM CONDUCTED DUE TO THE NATURE OF TELEPHONE ENCOUNTER    REVIEW   We reviewed their current medical history, medications, allergies, past medical history, surgical history, social history, family history, and review of systems, and updated all the information.     ASSESSMENT AND PLAN   Sherita ORTIZ? is a very pleasant, 21 y.o. with complicated diagnosis of     1. Migraine without aura and without status migrainosus, not intractable  topiramate (TOPAMAX) 100 MG tablet    DULoxetine (CYMBALTA) 20 MG capsule    sumatriptan (IMITREX) 100 MG tablet       Sherita ORTIZ has headaches that meet International Headache Society Criteria for episodic migraine.  Medication overuse is not likely contributing to this patient's headaches.   Patient feels not only severe disabling pain, but is also unable to function due to headaches.     My approach to every patient is multimodal.   I focused our discussion on addressing modifiable risk factors and trying to decrease them.  After discussion with the patient, I recommend the followin. Preventive medications:  Failed:   None    Continue: topiramate 100mg bid  Topamax (topiramate) is a preventive medication that is FDA approved for migraine prevention.   Further increase will be discussed at the next appointment. This can be titrated gradually up to 100 mg  to 200 mg as tolerated. Adverse events most commonly observed include tingling of fingers and toes, spelling problems and weight loss.   Very rare side effects include kidney  stones. For this reason, stay hydrated.   This medication is not recommended for pregnancy and breastfeeding.   We recommend using topiramate as preventive therapy for at least 1 year if you have no significant side effects.    We will prescribe the medication until you are on a stable dose, and than the expectation is for you to have the prescription managed by your primary care provider.     START: Duloxetine  Duloxetine (Cymbalta) is a SNRI (selective serotonin and norepinephrine reuptake inhibitor) that can help modulate your serotonin and norepinephrine levels. This is useful not only for migraine and pain prevention, but can also help with anxiety and improve your mood. I recommend that you start cymbalta 20 mg. Further increase will be discussed at the next appointment.?   Adverse events most commonly observed include nausea, and stomach upset.?   I recommend using cymbalta as preventive daily therapy for at least 1 year if you have no significant side effects.   I will prescribe the medication until you are on a stable dose, and than the expectation is for you to have the prescription managed by your primary care provider.     2. Acute (abortive) medications- these medications are to be taken only at the onset of severe headache and please limit a total of any combination of these medications to less than 6 days per month on average because they can cause rebound (medication overuse) headaches.   Failed:   Rizatriptan    CONTINUE:  Sumatriptan  SUMATRIPTAN (IMITREX) is used as migraine abortive. Use 100 mg at the onset of migraine; take another 100 mg in 1 hour if no relief. Sumatriptan is more effective if taken with 2 Aleve (naproxen) blue capsules and antinausea medication.   Do NOT take sumatriptan more than 6 days per month as this can cause rebound headache.   Do NOT take sumatriptan within 24 hours of taking any other triptan unless specifically told to do so by your doctor.    Do NOT take Imitrex  within 24 hours of taking any ergotamine medication such as DHE or Migranal.    Pregnancy and Breastfeeding: FDA pregnancy category C. This means that it is not known whether this drug will harm an unborn baby.    It is not known whether this medication passes into breast milk. Do not take without talking to your doctor if you are breast-feeding a baby.     Other medications: Do not take sumatriptan if you have taken a monoamine oxidase inhibitor (MAOI) such as isocarboxazid (Marplan), tranylcypromine (Parnate), or phenelzine (Nardil) within the last 14 days. The combination could cause seizures, nausea, vomiting, sweating, flushing, and dizziness.    Potential side effects: allergic reaction (difficulty breathing; closing of your throat; swelling of your lips, tongue, or face; or hives); severe or prolonged chest pain or an irregular heartbeat    Even if they go away in less than 1 hour, check with your doctor before using any more sumatriptan if any of the following side effects occur: chest pain (mild), heaviness, tightness, or pressure in chest and/or neck, difficulty in swallowing, pounding heartbeat, rash, hives, itching, or bumps on skin     3. Natural approaches to increasing brain energy and serotonin:    SAMe 200 mg a day    Vitamin B2 400 mg daily    Vitamin B12 1000 mcg daily    Getting early morning light to increase natural serotonin, melatonin. Could also consider light therapy box, 10,000 LUX.     4. Headache prevention and acute treatment over-the counter supplements    Boswellia 800 mg 2-3 times per day, example brand Sotomayor's, natural anti inflammatory herb    Sleep? modulation- melatonin at night 5 to 10 mg 30 minutes prior to sleep    Feverfew Tea 1-3 cups per day. Can get from [employer] or [employer]- A Natural anti inflammatory approach that does not cause further sensitization of the system.    Magnesium Citrate 250 mg daily, slowly increase up to 500-1000 mg daily. Side  effect may include diarrhea, so we recommend stopping at whatever dose is comfortable for your body without causing this side effect. This supplement can be very helpful for preventing headache, preventing migraine aura, calming nerves, muscles and even mood. Recommend starting slowly, as it can also lead to increased bowel movements or diarrhea.?     5. DIET: I recommend a diet that heavily favors fruits and vegetables while reducing carbs. More information is available upon request.     6. EXERCISE: Gentle movement exercises, concentrate on combination of muscle building and aerobic. I also recommend adding gentle Yoga therapy. The goal is 150 minutes per week of moderate to rigorous exercise, but you should start at your own pace and progress slowly and safely as discussed today.    7. ANXIETY/STRESS- Control stressors with yoga, meditation, counseling, or other methods of mindfulness.     8. SLEEP- aim for 7-8 hrs of restful sleep per night.     9. FUN- Increase fun time spend with friends and family     10. We have specifically discussed at all previous encounters the implications of topiramate on pregnancy and how it should not be used without concurrent use of birth control.    Benefits, side effects, and alternatives were discussed extensively with the patient.?     Sherita R verbally endorsed understanding of all the recommendations.?     she is going to follow up with me in 2months.  We will touch base via chart message or phone call in 1 month to discuss the effects of duloxetine and possibly increased to 60 mg daily.    Distant Site TELEPHONE Encounter   I conducted this encounter from [facility] facilities via TELEPHONE with Sherita who was at their home. Prior to the interview, the risks and benefits of telemedicine were discussed with the patient and verbal consent was obtained.? All participants were identified and introduced.Patient was notified that they may decline to receive medical services by  TELEPHONE and may withdraw from such care at any time. Patient's location within the [facility] was confirmed verbally.    I spent a total of 21 minutes preparing for the visit, documenting the visit, and directly speaking to the patient.?     A dictation device was used to produce this documentation which can sometimes result in grammatical errors or the replacement of similar sounding words.    James Porter, DO

## 2022-03-27 ENCOUNTER — PATIENT MESSAGE (OUTPATIENT)
Dept: NEUROLOGY | Facility: CLINIC | Age: 22
End: 2022-03-27

## 2022-04-01 ENCOUNTER — OFFICE VISIT (OUTPATIENT)
Dept: DERMATOLOGY | Facility: CLINIC | Age: 22
End: 2022-04-01
Payer: COMMERCIAL

## 2022-04-01 DIAGNOSIS — L85.8 KP (KERATOSIS PILARIS): ICD-10-CM

## 2022-04-01 DIAGNOSIS — L70.0 COMEDONAL ACNE: Primary | ICD-10-CM

## 2022-04-01 PROCEDURE — 99999 PR PBB SHADOW E&M-EST. PATIENT-LVL III: ICD-10-PCS | Mod: PBBFAC,,, | Performed by: DERMATOLOGY

## 2022-04-01 PROCEDURE — 1160F RVW MEDS BY RX/DR IN RCRD: CPT | Mod: CPTII,S$GLB,, | Performed by: DERMATOLOGY

## 2022-04-01 PROCEDURE — 1159F PR MEDICATION LIST DOCUMENTED IN MEDICAL RECORD: ICD-10-PCS | Mod: CPTII,S$GLB,, | Performed by: DERMATOLOGY

## 2022-04-01 PROCEDURE — 99204 OFFICE O/P NEW MOD 45 MIN: CPT | Mod: S$GLB,,, | Performed by: DERMATOLOGY

## 2022-04-01 PROCEDURE — 99999 PR PBB SHADOW E&M-EST. PATIENT-LVL III: CPT | Mod: PBBFAC,,, | Performed by: DERMATOLOGY

## 2022-04-01 PROCEDURE — 99204 PR OFFICE/OUTPT VISIT, NEW, LEVL IV, 45-59 MIN: ICD-10-PCS | Mod: S$GLB,,, | Performed by: DERMATOLOGY

## 2022-04-01 PROCEDURE — 1159F MED LIST DOCD IN RCRD: CPT | Mod: CPTII,S$GLB,, | Performed by: DERMATOLOGY

## 2022-04-01 PROCEDURE — 1160F PR REVIEW ALL MEDS BY PRESCRIBER/CLIN PHARMACIST DOCUMENTED: ICD-10-PCS | Mod: CPTII,S$GLB,, | Performed by: DERMATOLOGY

## 2022-04-01 RX ORDER — CLINDAMYCIN PHOSPHATE 10 MG/ML
SOLUTION TOPICAL 2 TIMES DAILY
Qty: 60 EACH | Refills: 3 | Status: SHIPPED | OUTPATIENT
Start: 2022-04-01 | End: 2023-03-21

## 2022-04-01 RX ORDER — TRETINOIN 0.25 MG/G
GEL TOPICAL
Qty: 45 G | Refills: 3 | Status: SHIPPED | OUTPATIENT
Start: 2022-04-01

## 2022-04-01 NOTE — PROGRESS NOTES
Subjective:       Patient ID:  Sherita Alejandra is a 21 y.o. female who presents for   Chief Complaint   Patient presents with    Acne     Nose, BL arm, chest     HPI  Pt c/o acne on nose, chest, BL arms x few months, red, no tx.  C/o redness on arms and blackheads on nose. Also has problems with acne on chest and occasionally back.  Has tried panoxyl, cetaphil, cerave, aveeno products.  Panoxyl seems to help her face. Uses it on her whole face. Skin around her eyes is dry.    Review of Systems   Constitutional: Negative for fever, chills and fatigue.   Skin: Positive for rash. Negative for itching.        Objective:    Physical Exam   Constitutional: She appears well-developed and well-nourished.   Neurological: She is alert and oriented to person, place, and time.   Psychiatric: She has a normal mood and affect.   Skin:   Areas Examined (abnormalities noted in diagram):   Head / Face Inspection Performed  Neck Inspection Performed  Chest / Axilla Inspection Performed  Back Inspection Performed  RUE Inspected  LUE Inspection Performed                   Diagram Legend     Erythematous scaling macule/papule c/w actinic keratosis       Vascular papule c/w angioma      Pigmented verrucoid papule/plaque c/w seborrheic keratosis      Yellow umbilicated papule c/w sebaceous hyperplasia      Irregularly shaped tan macule c/w lentigo     1-2 mm smooth white papules consistent with Milia      Movable subcutaneous cyst with punctum c/w epidermal inclusion cyst      Subcutaneous movable cyst c/w pilar cyst      Firm pink to brown papule c/w dermatofibroma      Pedunculated fleshy papule(s) c/w skin tag(s)      Evenly pigmented macule c/w junctional nevus     Mildly variegated pigmented, slightly irregular-bordered macule c/w mildly atypical nevus      Flesh colored to evenly pigmented papule c/w intradermal nevus       Pink pearly papule/plaque c/w basal cell carcinoma      Erythematous hyperkeratotic cursted plaque c/w  SCC      Surgical scar with no sign of skin cancer recurrence      Open and closed comedones      Inflammatory papules and pustules      Verrucoid papule consistent consistent with wart     Erythematous eczematous patches and plaques     Dystrophic onycholytic nail with subungual debris c/w onychomycosis     Umbilicated papule    Erythematous-base heme-crusted tan verrucoid plaque consistent with inflamed seborrheic keratosis     Erythematous Silvery Scaling Plaque c/w Psoriasis     See annotation      Assessment / Plan:        Comedonal acne  -     clindamycin (CLEOCIN T) 1 % Swab; Apply topically 2 (two) times daily.  Dispense: 60 each; Refill: 3  -     tretinoin (RETIN-A) 0.025 % gel; Apply small amount to entire face qhs. Wash off qam and apply sunscreen.  Dispense: 45 g; Refill: 3  Counseled pt that the retinoid may make the skin dry, red, and irritated. May moisturize daily with a non-comedogenic moisturizer. If still dry, would recommend using the retinoid every other night or less frequently as tolerated. Avoid using around corners of eyes, nose, and mouth.  Patient instructed in importance of daily broad spectrum sunscreen use with spf at least 30. Sun avoidance and topical protection/protective clothing discussed.  Decrease frequency of panoxyl use, and avoid around eyes.    KP (keratosis pilaris)  Recommend using a mild, moisturizing soap.  OTC keratolytics (Am lactin, Cerave SA) recommended for daily use after bath or shower.    Follow up in about 3 months (around 7/1/2022) for skin check or sooner for any concerns.

## 2022-04-01 NOTE — PATIENT INSTRUCTIONS
Sun Protection      The [facility] would like to remind you of the importance of sun protection all year round and particularly during the summer when the suns rays are the strongest. It has been proven that both acute and chronic sun exposure damages our cells and leads to skin cancer. Beyond skin cancer, the sun causes 90% of the symptoms of premature skin aging, including wrinkles, lentigines (brown spots), and thin, easily bruised skin. Proper sun protection can help prevent these unwanted conditions.    Many patients report that they dont go in the sun. It has been shown that the average person receives 18 hours of incidental sun exposure per week during activities such as walking through parking lots, driving, or sitting next to windows. This accumulates to several bad sunburns per year!    In choosing sunscreen, you want one that protects against both UVA and UVB rays (broad spectrum). It is recommended that you use one of SPF 30 or higher. It is important to apply the sunscreen about 20 minutes prior to sun exposure. Most sunscreens are chemical sunscreens and a reaction must take place in the skin so that they are effective. If they are applied and then you are immediately exposed to the sun or start sweating, this reaction has not had time to take place and you are therefore unprotected. Sunscreen needs to be reapplied every 2 hours if you are participating in water sports or sweating. We recommend Elta MD or CeraVe sunscreens for daily use; however there are many options and it is most important for you to find one that you will use on a consistent basis.    If you have sensitive skin, you may do best with a sunscreen that contains only physical blockers in the active ingredient section. The only physical blockers available in the [place] currently are titanium dioxide or zinc oxide. These are typically thicker and harder to apply, however they afford very good protection.  Neutrogena Sensitive Skin, Blue Lizard Sensitive Skin (pink top) or Neutrogena Pure and Free are popular ones.     Aside from sunscreen, clothes with UV protection (UPF), wide brimmed hats, and sunglasses are other means of sun protection that we recommend.      Based on a recent study (6/2021) and out of an abundance of caution, we are recommending that you AVOID the following sunscreens as they may contain the carcinogen, benzene:    Spray and gel sunscreens  Any [employer] or [employer] brands as well as Max Block and TopCare brands   Neutrogena Ultra Sheer Dry-touch Water Resistant Sunscreen LOTION SPF 70   Neutrogena Sheer Zinc Dry-touch Face Sunscreen LOTION SPF 50   5.   Aveeno Baby Continuous Protection Sensitive Skin Sunscreen LOTION - Broad Spectrum SPF 50    Please note that Benzene is not an ingredient or the degradation product of any ingredient in any sunscreen. This study suggested that the findings are a result of contamination in the manufacturing process. At this point, we don't know how effectively Benzene gets through the skin, if it gets absorbed systemically, and what effects it may have.     We do know that ultraviolet radiation is a well-established carcinogen. Please use daily sun protection/avoidance and use of at least SPF 30, broad-spectrum sunscreen not listed above.                       [facility] - DERMATOLOGY [address]  [address]  [place] [address]  Dept: [phone]  Dept Fax: [phone]                                                                                  RETINOIDS           Your doctor has prescribed a topical retinoid for your skin. A retinoid is a vitamin A derived product used to treat a variety of skin conditions including acne, actinic keratoses (pre-skin cancers), uneven pigmentation from sun damage, fine lines and wrinkles, and enlarged pores.    How do they work?         Retinoids increase skin cell turn over from the normal 30  days to five or six days, minimizing clogged pores-the major factor in acne. Retinoids can also repair the DNA in cells damaged by the sun helping to even out skin pigmentation and clear pre-skin cancers. They can shrink oil glands and minimize the appearance of large pores. These effects can not be appreciated unless the medication is used on a consistent basis!    How do I use a retinoid?         After washing with a mild cleanser (Purpose, Aqua glycolic face cleanser, Cetaphil, Neutrogena deep cream cleanser), the skin should be moisturized with a non-retinol containing moisturizer such as Cerave PM. Then a thin layer of medication is applied to the forehead, nose cheeks, and chin (and around eyes if treating fine lines and wrinkles) at night. The amount of medication needed to cover the entire face should be no more than the size of a green pea. Irritation around the eyes can be treated with Vaseline at night.    What if my skin appears dry, red, and is peeling?          Retinoids do not cause dry skin but rather they cause the top layer of the skin the shed, giving an appearance of dry skin. In fact, new healthy skin cells are replacing older, damaged cells on the surface. This usually occurs the first 2-4 weeks as the skin is adjusting to the medication. It is reasonable to use the medication every other night or even every 2 nights until your skin adjusts. You can use a MILD exfoliant to remove the peeling skin (Aveeno daily clarifying pads, Aqua glycolic face cream) and can apply a moisturizer throughout the day as needed. Retinoids come in a variety of strengths and vehicles and your doctor can find one best for you. If you cannot tolerate prescription strength retinoids, over the counter products with retinol may be beneficial. (Olay ProX wrinkle cream, JOSE deep wrinkle cream, Green Cream at Spontaneously)    Will my skin be more sensitive in the sun?           You will need to use sunscreen with SPF 30  daily. Retinoids will cause the outermost layer of the skin to be thinner and thus more sensitive to ultraviolet rays. However, remember that over time, retinoids actually make the skin thicker by enhancing collagen deposition which protects the skin from sun damage.    When will I see results?            If you are using a retinoid for acne, you should see improvement in 6-8 weeks. Do not be alarmed if you find that your acne gets worse before it gets better-KEEP USING THE MEDICATION- this is a normal response and your acne will improve if you can stick with it.           If you are using the medication for anti-aging and skin dyspigmentation, you may see results in 3 months, but most effects are not visible until 6 months. Retinoids are clinically proven to reverse signs of aging, but only if used on a CONSTISTENT BASIS!             Remember that retinoids should not be used if you are pregnant.           Discontinue use 1 week prior to waxing, as skin is more likely to tear.

## 2022-04-11 ENCOUNTER — TELEPHONE (OUTPATIENT)
Dept: FAMILY MEDICINE | Facility: CLINIC | Age: 22
End: 2022-04-11

## 2022-04-11 NOTE — TELEPHONE ENCOUNTER
----- Message from Tiffanie Kaur sent at 4/11/2022 11:05 AM CDT -----  Type: Patient Call Back    Who called: [facility]     What is the request in detail: Needs to speak with someone one regarding an MRI authorization.     Can the clinic reply by [facility]? No     Would the patient rather a call back or a response via [facility]? Call back     Best call back number: [phone] ext [phone]

## 2022-04-12 ENCOUNTER — TELEPHONE (OUTPATIENT)
Dept: FAMILY MEDICINE | Facility: CLINIC | Age: 22
End: 2022-04-12

## 2022-04-12 NOTE — TELEPHONE ENCOUNTER
----- Message from Violetta Pa sent at 4/11/2022  3:27 PM CDT -----  Type: Patient Call Back    Who called: self     What is the request in detail: return call     Can the clinic reply by [facility]?    Would the patient rather a call back or a response via [facility]? Call     Best call back number: [phone] ext [phone]

## 2022-04-14 ENCOUNTER — TELEPHONE (OUTPATIENT)
Dept: FAMILY MEDICINE | Facility: CLINIC | Age: 22
End: 2022-04-14

## 2022-04-14 DIAGNOSIS — G44.52 NEW DAILY PERSISTENT HEADACHE: Primary | ICD-10-CM

## 2022-04-14 NOTE — TELEPHONE ENCOUNTER
Attempted to call [facility] back twice at the extension that was left in message below. No answer. LVM.    Spoken with the pre cert dept- [facility] [phone]. MRI has not been worked on yet. The MRI order needs to be changed to external/outgoing since patient is requesting it to be done at [facility]. Also need to know when is the service date.  Attempted to call patient to see if/when her MRI was scheduled. No answer.

## 2022-04-14 NOTE — TELEPHONE ENCOUNTER
----- Message from Julienne Leahy MA sent at 4/14/2022  9:17 AM CDT -----  Type: Patient Call Back    Who called:Serena Espinosa [facility]    What is the request in detail:would like to know if the pt's. MRI was authorized..     Can the clinic reply by [facility]?no    Would the patient rather a call back or a response via [facility]? yes    Best call back number:[phone] ext: [phone]

## 2022-04-18 ENCOUNTER — PATIENT MESSAGE (OUTPATIENT)
Dept: FAMILY MEDICINE | Facility: CLINIC | Age: 22
End: 2022-04-18

## 2022-04-19 ENCOUNTER — PATIENT MESSAGE (OUTPATIENT)
Dept: NEUROLOGY | Facility: CLINIC | Age: 22
End: 2022-04-19

## 2022-04-19 DIAGNOSIS — G43.009 MIGRAINE WITHOUT AURA AND WITHOUT STATUS MIGRAINOSUS, NOT INTRACTABLE: Primary | ICD-10-CM

## 2022-04-25 ENCOUNTER — PATIENT MESSAGE (OUTPATIENT)
Dept: NEUROLOGY | Facility: CLINIC | Age: 22
End: 2022-04-25

## 2022-04-25 RX ORDER — DULOXETIN HYDROCHLORIDE 60 MG/1
60 CAPSULE, DELAYED RELEASE ORAL DAILY
Qty: 30 CAPSULE | Refills: 11 | Status: SHIPPED | OUTPATIENT
Start: 2022-04-25 | End: 2022-05-18

## 2022-05-11 ENCOUNTER — TELEPHONE (OUTPATIENT)
Dept: FAMILY MEDICINE | Facility: CLINIC | Age: 22
End: 2022-05-11

## 2022-05-11 NOTE — TELEPHONE ENCOUNTER
Attempted to call the number that was listed below. Recording stated that it was touro and the office was close. Unable to leave VM.

## 2022-05-11 NOTE — TELEPHONE ENCOUNTER
----- Message from Jacquelin Lin sent at 5/11/2022  3:07 PM CDT -----  Regarding: [facility]  .Type: Patient Call Back    Who called: Carisa with [facility]     What is the request in detail: checking on stat of auth for MRI of the brain with and without - patient is scheduled for May 18. Please leave on voice mail with the valid dates and auth.     Can the clinic reply by [facility]? Call     Would the patient rather a call back or a response via [facility]?  Call     Best call back number:  [phone] ext [phone]

## 2022-05-19 ENCOUNTER — PATIENT MESSAGE (OUTPATIENT)
Dept: NEUROLOGY | Facility: CLINIC | Age: 22
End: 2022-05-19

## 2022-06-01 RX ORDER — MEDROXYPROGESTERONE ACETATE 10 MG/1
10 TABLET ORAL DAILY
Qty: 10 TABLET | Refills: 11 | Status: SHIPPED | OUTPATIENT
Start: 2022-06-01 | End: 2022-07-16 | Stop reason: SDUPTHER

## 2022-06-01 NOTE — TELEPHONE ENCOUNTER
Last Office Visit Info:   The patient's last visit with Ryan Ragland MD was on 2/9/2022.    The patient's last visit in current department was on 2/9/2022.        Last CBC Results:   Lab Results   Component Value Date    WBC 5.55 07/13/2021    HGB 13.0 07/13/2021    HCT 41.1 07/13/2021     07/13/2021       Last CMP Results  Lab Results   Component Value Date     07/13/2021    K 4.4 07/13/2021     07/13/2021    CO2 23 07/13/2021    BUN 8 07/13/2021    CREATININE 0.8 07/13/2021    CALCIUM 9.5 07/13/2021    ALBUMIN 3.9 07/13/2021    AST 18 07/13/2021    ALT 20 07/13/2021       Last Lipids  Lab Results   Component Value Date    CHOL 124 05/25/2019    TRIG 129 05/25/2019    HDL 41 05/25/2019    LDLCALC 57.2 (L) 05/25/2019       Last A1C  Lab Results   Component Value Date    HGBA1C 5.2 07/13/2021       Last TSH  Lab Results   Component Value Date    TSH 4.453 (H) 07/13/2021             Current Med Refills  Medication List with Changes/Refills   Current Medications    CLINDAMYCIN (CLEOCIN T) 1 % SWAB    Apply topically 2 (two) times daily.       Start Date: 4/1/2022  End Date: 4/1/2023    DULOXETINE (CYMBALTA) 60 MG CAPSULE    TAKE 1 CAPSULE BY MOUTH EVERY DAY       Start Date: 5/18/2022 End Date: --    MEDROXYPROGESTERONE (PROVERA) 10 MG TABLET    Take 1 tablet (10 mg total) by mouth once daily.       Start Date: 3/22/2022 End Date: 3/22/2023    SUMATRIPTAN (IMITREX) 100 MG TABLET    Take 1 tablet (100 mg total) by mouth every 2 (two) hours as needed for Migraine. Max 200mg/24hrs.       Start Date: 3/22/2022 End Date: 4/21/2022    TOPIRAMATE (TOPAMAX) 100 MG TABLET    Take 1 tablet (100 mg total) by mouth 2 (two) times daily.       Start Date: 3/22/2022 End Date: 3/22/2023    TRETINOIN (RETIN-A) 0.025 % GEL    Apply small amount to entire face qhs. Wash off qam and apply sunscreen.       Start Date: 4/1/2022  End Date: --       Order(s) placed per written order guidelines:    Please  advise.

## 2022-06-07 ENCOUNTER — PATIENT MESSAGE (OUTPATIENT)
Dept: NEUROLOGY | Facility: CLINIC | Age: 22
End: 2022-06-07

## 2022-06-07 ENCOUNTER — DOCUMENTATION ONLY (OUTPATIENT)
Dept: NEUROLOGY | Facility: CLINIC | Age: 22
End: 2022-06-07

## 2022-06-07 NOTE — PROGRESS NOTES
05/2022 MRI brain w/wo was delivered on a cd to our office to review. No report available at the time of review, but the images showed no acute findings and no findings related to ongoing headaches.

## 2022-07-05 ENCOUNTER — ANESTHESIA EVENT (OUTPATIENT)
Dept: SURGERY | Facility: HOSPITAL | Age: 22
End: 2022-07-05
Payer: COMMERCIAL

## 2022-07-05 NOTE — ANESTHESIA PREPROCEDURE EVALUATION
[facility]  Anesthesia Pre-Operative Evaluation         Patient Name: Sherita Alejandra  YOB: 2000  MRN: [id number]    SUBJECTIVE:     Pre-operative evaluation for Procedure(s) (LRB):  TYMPANOPLASTY (Right)     07/05/2022    Sherita Alejandra is a 21 y.o. female w/ a significant PMHx of iron deficiency anemia, allergic rhinitis, and snoring.    Patient now presents for the above procedure(s).      LDA: None documented.       Prev airway: None documented.    Drips: None documented.      Patient Active Problem List   Diagnosis    Allergic rhinitis    Hyperinsulinism    Acanthosis nigricans    BMI, pediatric, 99th percentile or greater for age    Iron deficiency anemia    Other headache syndrome; 8/2021 HST negative; referred to neuro    Other abnormal glucose    Snoring    Migraine without aura and without status migrainosus, not intractable       Review of patient's allergies indicates:  No Known Allergies    Current Inpatient Medications:      No current facility-administered medications on file prior to encounter.     No current outpatient medications on file prior to encounter.       Past Surgical History:   Procedure Laterality Date    ADENOIDECTOMY      TONSILLECTOMY      TYMPANOSTOMY TUBE PLACEMENT      X2       Social History     Socioeconomic History    Marital status: Single   Tobacco Use    Smoking status: Never Smoker    Smokeless tobacco: Never Used   Substance and Sexual Activity    Alcohol use: No    Drug use: No    Sexual activity: Never   Social History Narrative    In 6th grade       OBJECTIVE:     Vital Signs Range (Last 24H):         Significant Labs:  Lab Results   Component Value Date    WBC 5.55 07/13/2021    HGB 13.0 07/13/2021    HCT 41.1 07/13/2021     07/13/2021    CHOL 124 05/25/2019    TRIG 129 05/25/2019    HDL 41 05/25/2019    ALT 20 07/13/2021    AST 18 07/13/2021     07/13/2021    K 4.4 07/13/2021     07/13/2021     CREATININE 0.8 07/13/2021    BUN 8 07/13/2021    CO2 23 07/13/2021    TSH 4.453 (H) 07/13/2021    GLUF 88 05/30/2011    HGBA1C 5.2 07/13/2021       Diagnostic Studies: No relevant studies.    EKG:   No results found for this or any previous visit.    2D ECHO:  TTE:  No results found for this or any previous visit.    YENY:  No results found for this or any previous visit.    ASSESSMENT/PLAN:                                                                                                                  07/05/2022  Sherita Alejandra is a 21 y.o., female.      Pre-op Assessment    I have reviewed the Patient Summary Reports.     I have reviewed the Nursing Notes. I have reviewed the NPO Status.   I have reviewed the Medications.     Review of Systems      Physical Exam  General: Well nourished, Cooperative, Alert and Oriented    Airway:  Mallampati: III / II  Mouth Opening: Normal  Tongue: Normal  Neck ROM: Normal ROM    Dental:  Intact        Anesthesia Plan  Type of Anesthesia, risks & benefits discussed:    Anesthesia Type: Gen ETT  Intra-op Monitoring Plan: Standard ASA Monitors  Post Op Pain Control Plan: multimodal analgesia  Induction:  IV  Airway Plan: Direct with ramp  Informed Consent: Informed consent signed with the Patient and all parties understand the risks and agree with anesthesia plan.  All questions answered.   ASA Score: 3  Day of Surgery Review of History & Physical: H&P Update referred to the surgeon/provider.    Ready For Surgery From Anesthesia Perspective.     .

## 2022-07-05 NOTE — PRE-PROCEDURE INSTRUCTIONS
PreOp Instructions given:   - Verbal medication information (what to hold and what to take)   - NPO guidelines   - Arrival place directions given; time to be given the day before procedure by the   Surgeon's Office 0600 - [facility]  - Bathing with antibacterial soap   - Don't wear any jewelry or bring any valuables AM of surgery   - No makeup or moisturizer to face   - No perfume/cologne, powder, lotions or aftershave   Pt. verbalized understanding.   Pt denies any h/o Anesthesia/Sedation complications or side effects.

## 2022-07-06 ENCOUNTER — HOSPITAL ENCOUNTER (OUTPATIENT)
Facility: HOSPITAL | Age: 22
Discharge: HOME | End: 2022-07-06
Attending: OTOLARYNGOLOGY | Admitting: OTOLARYNGOLOGY
Payer: COMMERCIAL

## 2022-07-06 ENCOUNTER — ANESTHESIA (OUTPATIENT)
Dept: SURGERY | Facility: HOSPITAL | Age: 22
End: 2022-07-06
Payer: COMMERCIAL

## 2022-07-06 DIAGNOSIS — H91.90 HEARING LOSS: Primary | ICD-10-CM

## 2022-07-06 LAB
B-HCG UR QL: NEGATIVE
CTP QC/QA: YES

## 2022-07-06 PROCEDURE — 69631 PR TYMPANOPLASTY: ICD-10-PCS | Mod: RT,,, | Performed by: OTOLARYNGOLOGY

## 2022-07-06 PROCEDURE — 71000016 HC POSTOP RECOV ADDL HR: Performed by: OTOLARYNGOLOGY

## 2022-07-06 PROCEDURE — 25000003 PHARM REV CODE 250

## 2022-07-06 PROCEDURE — D9220A PRA ANESTHESIA: ICD-10-PCS | Mod: ,,, | Performed by: ANESTHESIOLOGY

## 2022-07-06 PROCEDURE — 71000045 HC DOSC ROUTINE RECOVERY EA ADD'L HR: Performed by: OTOLARYNGOLOGY

## 2022-07-06 PROCEDURE — 36000708 HC OR TIME LEV III 1ST 15 MIN: Performed by: OTOLARYNGOLOGY

## 2022-07-06 PROCEDURE — 63600175 PHARM REV CODE 636 W HCPCS

## 2022-07-06 PROCEDURE — 37000008 HC ANESTHESIA 1ST 15 MINUTES: Performed by: OTOLARYNGOLOGY

## 2022-07-06 PROCEDURE — 71000044 HC DOSC ROUTINE RECOVERY FIRST HOUR: Performed by: OTOLARYNGOLOGY

## 2022-07-06 PROCEDURE — 25000003 PHARM REV CODE 250: Performed by: OTOLARYNGOLOGY

## 2022-07-06 PROCEDURE — 71000015 HC POSTOP RECOV 1ST HR: Performed by: OTOLARYNGOLOGY

## 2022-07-06 PROCEDURE — 81025 URINE PREGNANCY TEST: CPT | Performed by: OTOLARYNGOLOGY

## 2022-07-06 PROCEDURE — 37000009 HC ANESTHESIA EA ADD 15 MINS: Performed by: OTOLARYNGOLOGY

## 2022-07-06 PROCEDURE — 69631 REPAIR EARDRUM STRUCTURES: CPT | Mod: RT,,, | Performed by: OTOLARYNGOLOGY

## 2022-07-06 PROCEDURE — 36000709 HC OR TIME LEV III EA ADD 15 MIN: Performed by: OTOLARYNGOLOGY

## 2022-07-06 PROCEDURE — D9220A PRA ANESTHESIA: Mod: ,,, | Performed by: ANESTHESIOLOGY

## 2022-07-06 RX ORDER — MIDAZOLAM HYDROCHLORIDE 1 MG/ML
INJECTION, SOLUTION INTRAMUSCULAR; INTRAVENOUS
Status: DISCONTINUED | OUTPATIENT
Start: 2022-07-06 | End: 2022-07-06

## 2022-07-06 RX ORDER — ACETAMINOPHEN 500 MG
1000 TABLET ORAL
Status: COMPLETED | OUTPATIENT
Start: 2022-07-06 | End: 2022-07-06

## 2022-07-06 RX ORDER — FENTANYL CITRATE 50 UG/ML
INJECTION, SOLUTION INTRAMUSCULAR; INTRAVENOUS
Status: DISCONTINUED | OUTPATIENT
Start: 2022-07-06 | End: 2022-07-06

## 2022-07-06 RX ORDER — NEOSTIGMINE METHYLSULFATE 0.5 MG/ML
INJECTION, SOLUTION INTRAVENOUS
Status: DISCONTINUED | OUTPATIENT
Start: 2022-07-06 | End: 2022-07-06

## 2022-07-06 RX ORDER — LIDOCAINE HYDROCHLORIDE AND EPINEPHRINE 10; 10 MG/ML; UG/ML
INJECTION, SOLUTION INFILTRATION; PERINEURAL
Status: DISCONTINUED | OUTPATIENT
Start: 2022-07-06 | End: 2022-07-06 | Stop reason: HOSPADM

## 2022-07-06 RX ORDER — LIDOCAINE HYDROCHLORIDE 20 MG/ML
INJECTION, SOLUTION EPIDURAL; INFILTRATION; INTRACAUDAL; PERINEURAL
Status: DISCONTINUED | OUTPATIENT
Start: 2022-07-06 | End: 2022-07-06

## 2022-07-06 RX ORDER — METOCLOPRAMIDE HYDROCHLORIDE 5 MG/ML
10 INJECTION INTRAMUSCULAR; INTRAVENOUS EVERY 10 MIN PRN
Status: DISCONTINUED | OUTPATIENT
Start: 2022-07-06 | End: 2022-07-06 | Stop reason: HOSPADM

## 2022-07-06 RX ORDER — HYDROCODONE BITARTRATE AND ACETAMINOPHEN 5; 325 MG/1; MG/1
1 TABLET ORAL EVERY 6 HOURS PRN
Qty: 10 TABLET | Refills: 0 | Status: SHIPPED | OUTPATIENT
Start: 2022-07-06 | End: 2022-07-06 | Stop reason: SDUPTHER

## 2022-07-06 RX ORDER — LIDOCAINE HYDROCHLORIDE 10 MG/ML
1 INJECTION, SOLUTION EPIDURAL; INFILTRATION; INTRACAUDAL; PERINEURAL ONCE AS NEEDED
Status: DISCONTINUED | OUTPATIENT
Start: 2022-07-06 | End: 2022-07-06 | Stop reason: HOSPADM

## 2022-07-06 RX ORDER — CEFAZOLIN SODIUM/WATER 2 G/20 ML
SYRINGE (ML) INTRAVENOUS
Status: DISCONTINUED | OUTPATIENT
Start: 2022-07-06 | End: 2022-07-06

## 2022-07-06 RX ORDER — OFLOXACIN 3 MG/ML
SOLUTION/ DROPS OPHTHALMIC
Status: DISCONTINUED | OUTPATIENT
Start: 2022-07-06 | End: 2022-07-06 | Stop reason: HOSPADM

## 2022-07-06 RX ORDER — DEXMEDETOMIDINE HYDROCHLORIDE 100 UG/ML
INJECTION, SOLUTION INTRAVENOUS
Status: DISCONTINUED | OUTPATIENT
Start: 2022-07-06 | End: 2022-07-06

## 2022-07-06 RX ORDER — PROPOFOL 10 MG/ML
VIAL (ML) INTRAVENOUS
Status: DISCONTINUED | OUTPATIENT
Start: 2022-07-06 | End: 2022-07-06

## 2022-07-06 RX ORDER — KETAMINE HCL IN 0.9 % NACL 50 MG/5 ML
SYRINGE (ML) INTRAVENOUS
Status: DISCONTINUED | OUTPATIENT
Start: 2022-07-06 | End: 2022-07-06

## 2022-07-06 RX ORDER — HYDROCODONE BITARTRATE AND ACETAMINOPHEN 5; 325 MG/1; MG/1
1 TABLET ORAL EVERY 6 HOURS PRN
Qty: 11 TABLET | Refills: 0 | Status: SHIPPED | OUTPATIENT
Start: 2022-07-06 | End: 2022-11-14

## 2022-07-06 RX ORDER — SODIUM CHLORIDE 0.9 % (FLUSH) 0.9 %
10 SYRINGE (ML) INJECTION
Status: DISCONTINUED | OUTPATIENT
Start: 2022-07-06 | End: 2022-07-06 | Stop reason: HOSPADM

## 2022-07-06 RX ORDER — SODIUM CHLORIDE 9 MG/ML
INJECTION, SOLUTION INTRAVENOUS CONTINUOUS
Status: DISCONTINUED | OUTPATIENT
Start: 2022-07-06 | End: 2022-07-06 | Stop reason: HOSPADM

## 2022-07-06 RX ORDER — ONDANSETRON 4 MG/1
8 TABLET, ORALLY DISINTEGRATING ORAL EVERY 8 HOURS PRN
Qty: 15 TABLET | Refills: 0 | Status: SHIPPED | OUTPATIENT
Start: 2022-07-06 | End: 2022-11-14

## 2022-07-06 RX ORDER — FENTANYL CITRATE 50 UG/ML
25 INJECTION, SOLUTION INTRAMUSCULAR; INTRAVENOUS EVERY 5 MIN PRN
Status: DISCONTINUED | OUTPATIENT
Start: 2022-07-06 | End: 2022-07-06 | Stop reason: HOSPADM

## 2022-07-06 RX ORDER — OFLOXACIN 3 MG/ML
4 SOLUTION/ DROPS OPHTHALMIC 2 TIMES DAILY
Qty: 10 ML | Refills: 0 | Status: SHIPPED | OUTPATIENT
Start: 2022-07-06 | End: 2022-11-14

## 2022-07-06 RX ORDER — ONDANSETRON 2 MG/ML
INJECTION INTRAMUSCULAR; INTRAVENOUS
Status: DISCONTINUED | OUTPATIENT
Start: 2022-07-06 | End: 2022-07-06

## 2022-07-06 RX ORDER — DEXAMETHASONE SODIUM PHOSPHATE 4 MG/ML
INJECTION, SOLUTION INTRA-ARTICULAR; INTRALESIONAL; INTRAMUSCULAR; INTRAVENOUS; SOFT TISSUE
Status: DISCONTINUED | OUTPATIENT
Start: 2022-07-06 | End: 2022-07-06

## 2022-07-06 RX ORDER — ROCURONIUM BROMIDE 10 MG/ML
INJECTION, SOLUTION INTRAVENOUS
Status: DISCONTINUED | OUTPATIENT
Start: 2022-07-06 | End: 2022-07-06

## 2022-07-06 RX ADMIN — GLYCOPYRROLATE 0.4 MG: 0.2 INJECTION INTRAMUSCULAR; INTRAVENOUS at 11:07

## 2022-07-06 RX ADMIN — DEXAMETHASONE SODIUM PHOSPHATE 4 MG: 4 INJECTION INTRA-ARTICULAR; INTRALESIONAL; INTRAMUSCULAR; INTRAVENOUS; SOFT TISSUE at 09:07

## 2022-07-06 RX ADMIN — FENTANYL CITRATE 100 MCG: 50 INJECTION INTRAMUSCULAR; INTRAVENOUS at 08:07

## 2022-07-06 RX ADMIN — SODIUM CHLORIDE, SODIUM GLUCONATE, SODIUM ACETATE, POTASSIUM CHLORIDE, MAGNESIUM CHLORIDE, SODIUM PHOSPHATE, DIBASIC, AND POTASSIUM PHOSPHATE: .53; .5; .37; .037; .03; .012; .00082 INJECTION, SOLUTION INTRAVENOUS at 10:07

## 2022-07-06 RX ADMIN — DEXMEDETOMIDINE HYDROCHLORIDE 4 MCG: 100 INJECTION, SOLUTION INTRAVENOUS at 10:07

## 2022-07-06 RX ADMIN — ACETAMINOPHEN 1000 MG: 500 TABLET ORAL at 07:07

## 2022-07-06 RX ADMIN — Medication 20 MG: at 08:07

## 2022-07-06 RX ADMIN — PROPOFOL 80 MG: 10 INJECTION, EMULSION INTRAVENOUS at 08:07

## 2022-07-06 RX ADMIN — ROCURONIUM BROMIDE 30 MG: 10 INJECTION INTRAVENOUS at 09:07

## 2022-07-06 RX ADMIN — ROCURONIUM BROMIDE 20 MG: 10 INJECTION INTRAVENOUS at 09:07

## 2022-07-06 RX ADMIN — MIDAZOLAM 2 MG: 1 INJECTION INTRAMUSCULAR; INTRAVENOUS at 07:07

## 2022-07-06 RX ADMIN — DEXMEDETOMIDINE HYDROCHLORIDE 4 MCG: 100 INJECTION, SOLUTION INTRAVENOUS at 09:07

## 2022-07-06 RX ADMIN — NEOSTIGMINE METHYLSULFATE 5 MG: 0.5 INJECTION, SOLUTION INTRAVENOUS at 11:07

## 2022-07-06 RX ADMIN — ROCURONIUM BROMIDE 10 MG: 10 INJECTION INTRAVENOUS at 10:07

## 2022-07-06 RX ADMIN — Medication 10 MG: at 09:07

## 2022-07-06 RX ADMIN — LIDOCAINE HYDROCHLORIDE 100 MG: 20 INJECTION, SOLUTION EPIDURAL; INFILTRATION; INTRACAUDAL at 08:07

## 2022-07-06 RX ADMIN — ROCURONIUM BROMIDE 40 MG: 10 INJECTION INTRAVENOUS at 08:07

## 2022-07-06 RX ADMIN — DEXMEDETOMIDINE HYDROCHLORIDE 8 MCG: 100 INJECTION, SOLUTION INTRAVENOUS at 09:07

## 2022-07-06 RX ADMIN — ONDANSETRON 4 MG: 2 INJECTION INTRAMUSCULAR; INTRAVENOUS at 09:07

## 2022-07-06 RX ADMIN — Medication 2 G: at 08:07

## 2022-07-06 RX ADMIN — SODIUM CHLORIDE: 0.9 INJECTION, SOLUTION INTRAVENOUS at 07:07

## 2022-07-06 RX ADMIN — PROPOFOL 50 MG: 10 INJECTION, EMULSION INTRAVENOUS at 09:07

## 2022-07-06 NOTE — H&P
Subjective:       Patient ID: Sherita Alejandra is a 21 y.o. female.     Chief Complaint: tympanic membrane rupture (Right ear w/ hearing test.)     HPI      Sherita Alejandra is a 21 y.o. female presents for evaluation of right tympanic membrane perforation.  Patient thinks has been present for 3 months.  She reports a history of repeated perforations in that ear.  She reports a history of tympanostomy tubes as a child.  Denies chronic ear infections or otorrhea.  Notes mild ear fullness and popping occasionally.     Review of Systems   Constitutional: Negative.    HENT: Positive for ear pain and hearing loss.    Eyes: Negative.    Respiratory: Negative.    Cardiovascular: Negative.    Gastrointestinal: Negative.    Endocrine: Negative.    Genitourinary: Negative.    Musculoskeletal: Negative.    Integumentary:  Negative.   Allergic/Immunologic: Negative.    Neurological: Positive for headaches.   Hematological: Negative.    Psychiatric/Behavioral: Negative.           Objective:   Physical Exam  Vitals and nursing note reviewed.   Constitutional:       Appearance: Normal appearance.   HENT:      Head: Normocephalic and atraumatic.      Right Ear: Ear canal and external ear normal. There is no impacted cerumen. Tympanic membrane is perforated (Dry anterior central perforation just adjacent to the malleus approximately 25-30% the drum.).      Left Ear: Tympanic membrane, ear canal and external ear normal. There is no impacted cerumen.      Ears:        Comments: Ear canal normal size with view of annulus.  Neurological:      Mental Status: She is alert.      data reviewed:          Audiogram tracings independently reviewed by me shows normal hearing of left ear.  Right side with mild conductive hearing loss and type a tympanogram.        Assessment:       Problem List Items Addressed This Visit    None              Visit Diagnoses      Conductive hearing loss of right ear with unrestricted hearing of left ear    -   Primary     Tympanic membrane rupture, right               Plan:         in summary this is a 21-year-old female with right tympanic membrane perforation.      Discussed right Tympanoplasty with patient r Risks, complications, alternatives and goals reviewed including possible infection, bleeding, hearing loss, chronic drainage, graft failure, facial nerve problems, dural injury and other potential problems.      Patient to discuss with her mother and will call back to schedule surgery if interested.  Answers for HPI/ROS submitted by the patient on 3/10/2022  Ear infection(s)?: Yes      Reviewed H&P. Agree with above.

## 2022-07-06 NOTE — OP NOTE
[facility] - Surgery ([facility])  Surgery Department  Operative Note    SUMMARY     Date of Procedure: 7/6/2022     Procedure: Procedure(s) (LRB):  TYMPANOPLASTY (Right)     Surgeon(s) and Role:     * Garry Huber MD - Primary    Assisting Surgeon: None    Pre-Operative Diagnosis: Tympanic membrane rupture, right [H72.91]  Conductive hearing loss of right ear with unrestricted hearing of left ear [H90.11]    Post-Operative Diagnosis: Post-Op Diagnosis Codes:     * Tympanic membrane rupture, right [H72.91]     * Conductive hearing loss of right ear with unrestricted hearing of left ear [H90.11]    Anesthesia: General    Operative Findings (including complications, if any):   1.  Anterior superior perforation, 40% dry  2.  Post auricular underlay, w/ malleus dissection w/ umbo intact. Temporalis fascia      Description of Technical Procedures:       The patient was taken to the operating room, placed under general anesthetic and intubated without difficulty.  The right ear was steriley prepped and draped.  We inspected the ear canal, identified the defect: there was a 40% anterior superior defect, posterior margin was along the malleus.   We then infiltrated the canal and postauricular area with 1:100,000 of epinephrine.  The edges of the perforation were freshened using a sharp pick and a cup forceps.   Vascular strip incisions were made in the standard fashion for underlay tympanoplasty making our 12-6 incision a few millimeters from the defect.     A postauricular incision was made just behind the postauricular crease and dissected down the mastoid cortex.  A temporalis fascia graft was harvested using scissors.  I reflected the soft tissues anteriorly to the level of the ear canal and identified our prior incisions made in the ear canal. A tympanomeatal flap was raised and the middle ear was entered sharply.  The flap was raised superiorly to the malleus and inferiorly to fully expose the perforation.    The malleus  was dissected using sharp microinstrumentation releasing the lateral process in a superior to inferior direction.  The attachments of the umbo were left connect.     The graft was then cut to size.  It was placed in an undersurface fashion. It was placed lateral to the malleus and medial to the native TM anteriorly a flange was cut towards the eustachian tube orifice. This was position and then gelfoam was guided into the ET orifice to support the graft there. The perforation to was examined to make sure all margins were visible and covered by the graft.  Gelfoam was placed in the middle ear to support the graft.  Then gelfoam was applied to the lateral surface of the graft to further improve adherence.       The ear canal was filled with gelfoam and the vascular strip was carefully laid back down.    There periosteum of the mastoid was closed with sutures, and the dermis was closed in a subcuticular manner.  Steri-strips were applied to the skin.    Kosciusko dressing was applied. The patient was awakened from anesthesia and taken to the recovery room in stable condition. He will be given antibiotics and pain medicines and he will be given instructions to follow up with me in one week.         Estimated Blood Loss (EBL): 20ml           Implants: * No implants in log *    Specimens:   Specimen (24h ago, onward)            None                  Condition: Good    Disposition: PACU - hemodynamically stable.    Attestation: I was present and scrubbed for the entire procedure.

## 2022-07-06 NOTE — PLAN OF CARE
Discharge instructions given to patient, mother and father. Copies provided. All verbalized understanding. VSS. No c/o pain or nausea. No s/s of distress noted. Patient states has some discomfort to surgical site to not painful at this time. RX x3 filled and brought to bedside. Surgical site C/D/I. Patient meets criteria for discharge. Patient taken to vehicle via wc.

## 2022-07-06 NOTE — BRIEF OP NOTE
[facility] - Surgery (2nd Fl)  Brief Operative Note    Surgery Date: 7/6/2022     Surgeon(s) and Role:     * Garry Huber MD - Primary    Assisting Surgeon: None    Pre-op Diagnosis:  Tympanic membrane rupture, right [H72.91]  Conductive hearing loss of right ear with unrestricted hearing of left ear [H90.11]    Post-op Diagnosis:  Post-Op Diagnosis Codes:     * Tympanic membrane rupture, right [H72.91]     * Conductive hearing loss of right ear with unrestricted hearing of left ear [H90.11]    Procedure(s) (LRB):  TYMPANOPLASTY (Right)    Anesthesia: General    Operative Findings: Right superior anterior central perforation    Estimated Blood Loss: * No values recorded between 7/6/2022  8:40 AM and 7/6/2022 11:13 AM *         Specimens:   Specimen (24h ago, onward)            None            Discharge Note    OUTCOME: Patient tolerated treatment/procedure well without complication and is now ready for discharge.    DISPOSITION: Home or Self Care    FINAL DIAGNOSIS:  Conductive Hearing Loss    FOLLOWUP: In clinic    DISCHARGE INSTRUCTIONS:    Discharge Procedure Orders   Diet Adult Regular     Other restrictions (specify):   Order Comments: No nose blowing. No lifting or heavy straining.     Notify your health care provider if you experience any of the following:  temperature >100.4     Notify your health care provider if you experience any of the following:  severe uncontrolled pain     Notify your health care provider if you experience any of the following:  severe persistent headache     Remove dressing in 24 hours

## 2022-07-06 NOTE — TRANSFER OF CARE
"Anesthesia Transfer of Care Note    Patient: Sherita Alejandra    Procedure(s) Performed: Procedure(s) (LRB):  TYMPANOPLASTY (Right)    Patient location: [facility]    Anesthesia Type: general    Transport from OR: Transported from OR on 6-10 L/min O2 by face mask with adequate spontaneous ventilation    Post pain: adequate analgesia    Post assessment: no apparent anesthetic complications and tolerated procedure well    Post vital signs: stable    Level of consciousness: sedated and responds to stimulation    Nausea/Vomiting: no nausea/vomiting    Complications: none    Transfer of care protocol was followed      Last vitals:   Visit Vitals  BP (!) 120/55 (BP Location: Left arm, Patient Position: Lying)   Pulse 82   Temp 36.6 °C (97.8 °F) (Oral)   Resp 20   Ht 5' 2" (1.575 m)   Wt 104.3 kg (230 lb)   LMP 06/27/2022 (Approximate)   SpO2 98%   Breastfeeding No   BMI 42.07 kg/m²     "

## 2022-07-06 NOTE — ANESTHESIA PROCEDURE NOTES
Intubation    Date/Time: 7/6/2022 8:19 AM  Performed by: Mary Lipscomb MD  Authorized by: Javier Mustafa MD     Intubation:     Induction:  Intravenous    Intubated:  Postinduction    Mask Ventilation:  Easy mask    Attempts:  1    Attempted By:  Resident anesthesiologist    Method of Intubation:  Direct    Blade:  Karlos 3    Laryngeal View Grade: Grade I - full view of cords      Difficult Airway Encountered?: No      Complications:  None    Airway Device:  Oral endotracheal tube    Airway Device Size:  7.0    Style/Cuff Inflation:  Cuffed (inflated to minimal occlusive pressure)    Tube secured:  24    Secured at:  The lips    Complicating Factors:  None    Findings Post-Intubation:  BS equal bilateral

## 2022-07-07 VITALS
RESPIRATION RATE: 20 BRPM | TEMPERATURE: 97 F | DIASTOLIC BLOOD PRESSURE: 80 MMHG | WEIGHT: 230 LBS | HEIGHT: 62 IN | OXYGEN SATURATION: 97 % | HEART RATE: 70 BPM | SYSTOLIC BLOOD PRESSURE: 132 MMHG | BODY MASS INDEX: 42.33 KG/M2

## 2022-07-07 NOTE — ANESTHESIA POSTPROCEDURE EVALUATION
Anesthesia Post Evaluation    Patient: Sherita Alejandra    Procedure(s) Performed: Procedure(s) (LRB):  TYMPANOPLASTY (Right)    Final Anesthesia Type: general      Patient location during evaluation: PACU  Patient participation: Yes- Able to Participate  Level of consciousness: awake and alert and oriented  Post-procedure vital signs: reviewed and stable  Pain management: adequate  Airway patency: patent    PONV status at discharge: No PONV  Anesthetic complications: no      Cardiovascular status: blood pressure returned to baseline  Respiratory status: unassisted, spontaneous ventilation and room air  Hydration status: euvolemic  Follow-up not needed.          Vitals Value Taken Time   /80 07/06/22 1317   Temp 36.2 °C (97.2 °F) 07/06/22 1115   Pulse 70 07/06/22 1330   Resp 20 07/06/22 1330   SpO2 97 % 07/06/22 1330         No case tracking events are documented in the log.      Pain/Mehnaz Score: Pain Rating Prior to Med Admin: 0 (7/6/2022  7:21 AM)  Mehnaz Score: 10 (7/6/2022  1:30 PM)

## 2022-07-15 ENCOUNTER — OFFICE VISIT (OUTPATIENT)
Dept: OTOLARYNGOLOGY | Facility: CLINIC | Age: 22
End: 2022-07-15
Payer: COMMERCIAL

## 2022-07-15 DIAGNOSIS — Z09 POSTOPERATIVE EXAMINATION: Primary | ICD-10-CM

## 2022-07-15 PROCEDURE — 1159F PR MEDICATION LIST DOCUMENTED IN MEDICAL RECORD: ICD-10-PCS | Mod: CPTII,S$GLB,, | Performed by: OTOLARYNGOLOGY

## 2022-07-15 PROCEDURE — 99999 PR PBB SHADOW E&M-EST. PATIENT-LVL II: ICD-10-PCS | Mod: PBBFAC,,, | Performed by: OTOLARYNGOLOGY

## 2022-07-15 PROCEDURE — 99024 PR POST-OP FOLLOW-UP VISIT: ICD-10-PCS | Mod: S$GLB,,, | Performed by: OTOLARYNGOLOGY

## 2022-07-15 PROCEDURE — 1159F MED LIST DOCD IN RCRD: CPT | Mod: CPTII,S$GLB,, | Performed by: OTOLARYNGOLOGY

## 2022-07-15 PROCEDURE — 99024 POSTOP FOLLOW-UP VISIT: CPT | Mod: S$GLB,,, | Performed by: OTOLARYNGOLOGY

## 2022-07-15 PROCEDURE — 99999 PR PBB SHADOW E&M-EST. PATIENT-LVL II: CPT | Mod: PBBFAC,,, | Performed by: OTOLARYNGOLOGY

## 2022-07-15 NOTE — PROGRESS NOTES
Steri strips removed. No evidence of hematoma or seroma. No evidence of infection. Packing is dry and intact. Start ototopical drops and re check in three weeks.

## 2022-07-27 ENCOUNTER — PATIENT MESSAGE (OUTPATIENT)
Dept: OTOLARYNGOLOGY | Facility: CLINIC | Age: 22
End: 2022-07-27
Payer: COMMERCIAL

## 2022-08-12 ENCOUNTER — OFFICE VISIT (OUTPATIENT)
Dept: OTOLARYNGOLOGY | Facility: CLINIC | Age: 22
End: 2022-08-12
Payer: COMMERCIAL

## 2022-08-12 VITALS — WEIGHT: 230 LBS | BODY MASS INDEX: 42.07 KG/M2

## 2022-08-12 DIAGNOSIS — Z09 POSTOPERATIVE EXAMINATION: Primary | ICD-10-CM

## 2022-08-12 PROCEDURE — 3008F PR BODY MASS INDEX (BMI) DOCUMENTED: ICD-10-PCS | Mod: CPTII,S$GLB,, | Performed by: OTOLARYNGOLOGY

## 2022-08-12 PROCEDURE — 1159F MED LIST DOCD IN RCRD: CPT | Mod: CPTII,S$GLB,, | Performed by: OTOLARYNGOLOGY

## 2022-08-12 PROCEDURE — 99024 PR POST-OP FOLLOW-UP VISIT: ICD-10-PCS | Mod: S$GLB,,, | Performed by: OTOLARYNGOLOGY

## 2022-08-12 PROCEDURE — 99999 PR PBB SHADOW E&M-EST. PATIENT-LVL III: CPT | Mod: PBBFAC,,, | Performed by: OTOLARYNGOLOGY

## 2022-08-12 PROCEDURE — 99999 PR PBB SHADOW E&M-EST. PATIENT-LVL III: ICD-10-PCS | Mod: PBBFAC,,, | Performed by: OTOLARYNGOLOGY

## 2022-08-12 PROCEDURE — 3008F BODY MASS INDEX DOCD: CPT | Mod: CPTII,S$GLB,, | Performed by: OTOLARYNGOLOGY

## 2022-08-12 PROCEDURE — 1159F PR MEDICATION LIST DOCUMENTED IN MEDICAL RECORD: ICD-10-PCS | Mod: CPTII,S$GLB,, | Performed by: OTOLARYNGOLOGY

## 2022-08-12 PROCEDURE — 99024 POSTOP FOLLOW-UP VISIT: CPT | Mod: S$GLB,,, | Performed by: OTOLARYNGOLOGY

## 2022-08-12 NOTE — PROGRESS NOTES
Post Op Note    1 mo s/p R dexter TORRES tympanoplasty. Doing well. Using drops. No otorrhea. Hearing okay.    EAC occluded with gelfoam, suctioned. Graft appears to have 100% take. AC>BC right ear.     RTC 1 mo with audio.

## 2022-09-14 DIAGNOSIS — Z11.59 NEED FOR HEPATITIS C SCREENING TEST: ICD-10-CM

## 2022-11-14 ENCOUNTER — OFFICE VISIT (OUTPATIENT)
Dept: FAMILY MEDICINE | Facility: CLINIC | Age: 22
End: 2022-11-14
Payer: COMMERCIAL

## 2022-11-14 ENCOUNTER — LAB VISIT (OUTPATIENT)
Dept: LAB | Facility: HOSPITAL | Age: 22
End: 2022-11-14
Attending: INTERNAL MEDICINE
Payer: COMMERCIAL

## 2022-11-14 VITALS
SYSTOLIC BLOOD PRESSURE: 126 MMHG | WEIGHT: 235.81 LBS | OXYGEN SATURATION: 97 % | BODY MASS INDEX: 43.39 KG/M2 | DIASTOLIC BLOOD PRESSURE: 80 MMHG | HEART RATE: 102 BPM | TEMPERATURE: 98 F | HEIGHT: 62 IN

## 2022-11-14 DIAGNOSIS — Z11.8 SCREENING FOR CHLAMYDIAL DISEASE: ICD-10-CM

## 2022-11-14 DIAGNOSIS — H65.92 LEFT NON-SUPPURATIVE OTITIS MEDIA: Primary | ICD-10-CM

## 2022-11-14 PROCEDURE — 3074F PR MOST RECENT SYSTOLIC BLOOD PRESSURE < 130 MM HG: ICD-10-PCS | Mod: CPTII,S$GLB,, | Performed by: INTERNAL MEDICINE

## 2022-11-14 PROCEDURE — 99213 OFFICE O/P EST LOW 20 MIN: CPT | Mod: S$GLB,,, | Performed by: INTERNAL MEDICINE

## 2022-11-14 PROCEDURE — 99213 PR OFFICE/OUTPT VISIT, EST, LEVL III, 20-29 MIN: ICD-10-PCS | Mod: S$GLB,,, | Performed by: INTERNAL MEDICINE

## 2022-11-14 PROCEDURE — 99999 PR PBB SHADOW E&M-EST. PATIENT-LVL IV: ICD-10-PCS | Mod: PBBFAC,,, | Performed by: INTERNAL MEDICINE

## 2022-11-14 PROCEDURE — 3079F PR MOST RECENT DIASTOLIC BLOOD PRESSURE 80-89 MM HG: ICD-10-PCS | Mod: CPTII,S$GLB,, | Performed by: INTERNAL MEDICINE

## 2022-11-14 PROCEDURE — 1159F PR MEDICATION LIST DOCUMENTED IN MEDICAL RECORD: ICD-10-PCS | Mod: CPTII,S$GLB,, | Performed by: INTERNAL MEDICINE

## 2022-11-14 PROCEDURE — 3074F SYST BP LT 130 MM HG: CPT | Mod: CPTII,S$GLB,, | Performed by: INTERNAL MEDICINE

## 2022-11-14 PROCEDURE — 3008F BODY MASS INDEX DOCD: CPT | Mod: CPTII,S$GLB,, | Performed by: INTERNAL MEDICINE

## 2022-11-14 PROCEDURE — 1159F MED LIST DOCD IN RCRD: CPT | Mod: CPTII,S$GLB,, | Performed by: INTERNAL MEDICINE

## 2022-11-14 PROCEDURE — 99999 PR PBB SHADOW E&M-EST. PATIENT-LVL IV: CPT | Mod: PBBFAC,,, | Performed by: INTERNAL MEDICINE

## 2022-11-14 PROCEDURE — 3008F PR BODY MASS INDEX (BMI) DOCUMENTED: ICD-10-PCS | Mod: CPTII,S$GLB,, | Performed by: INTERNAL MEDICINE

## 2022-11-14 PROCEDURE — 87491 CHLMYD TRACH DNA AMP PROBE: CPT | Performed by: INTERNAL MEDICINE

## 2022-11-14 PROCEDURE — 3079F DIAST BP 80-89 MM HG: CPT | Mod: CPTII,S$GLB,, | Performed by: INTERNAL MEDICINE

## 2022-11-14 PROCEDURE — 1160F RVW MEDS BY RX/DR IN RCRD: CPT | Mod: CPTII,S$GLB,, | Performed by: INTERNAL MEDICINE

## 2022-11-14 PROCEDURE — 87591 N.GONORRHOEAE DNA AMP PROB: CPT | Performed by: INTERNAL MEDICINE

## 2022-11-14 PROCEDURE — 1160F PR REVIEW ALL MEDS BY PRESCRIBER/CLIN PHARMACIST DOCUMENTED: ICD-10-PCS | Mod: CPTII,S$GLB,, | Performed by: INTERNAL MEDICINE

## 2022-11-14 RX ORDER — AMOXICILLIN AND CLAVULANATE POTASSIUM 875; 125 MG/1; MG/1
1 TABLET, FILM COATED ORAL 2 TIMES DAILY
Qty: 20 TABLET | Refills: 0 | Status: SHIPPED | OUTPATIENT
Start: 2022-11-14 | End: 2022-11-24

## 2022-11-14 RX ORDER — IPRATROPIUM BROMIDE 42 UG/1
2 SPRAY, METERED NASAL 3 TIMES DAILY
Qty: 15 ML | Refills: 0 | Status: SHIPPED | OUTPATIENT
Start: 2022-11-14 | End: 2022-12-07

## 2022-11-14 NOTE — PROGRESS NOTES
This note was created by combination of typed  and M-Modal dictation.  Transcription errors may be present.  If there are any questions, please contact me.    Assessment and Plan:   Left non-suppurative otitis media  -ongoing x 1 week without relief  Augmentin  Atrovent nasal spray for congestion/eustachian tube inflammation  -     ipratropium (ATROVENT) 42 mcg (0.06 %) nasal spray; 2 sprays by Each Nostril route 3 (three) times daily. AS NEEDED FOR NASAL CONGESTION AND DRIP for 7 days  Dispense: 15 mL; Refill: 0  -     amoxicillin-clavulanate 875-125mg (AUGMENTIN) 875-125 mg per tablet; Take 1 tablet by mouth 2 (two) times daily. for 10 days  Dispense: 20 tablet; Refill: 0    Screening for chlamydial disease  -routine Chl screening per guidelines.  -     C. trachomatis/N. gonorrhoeae by AMP DNA; Future; Expected date: 2022        Medications Discontinued During This Encounter   Medication Reason    HYDROcodone-acetaminophen (NORCO) 5-325 mg per tablet     ofloxacin (OCUFLOX) 0.3 % ophthalmic solution     ondansetron (ZOFRAN-ODT) 4 MG TbDL        meds sent this encounter:  Medications Ordered This Encounter   Medications    amoxicillin-clavulanate 875-125mg (AUGMENTIN) 875-125 mg per tablet     Sig: Take 1 tablet by mouth 2 (two) times daily. for 10 days     Dispense:  20 tablet     Refill:  0    ipratropium (ATROVENT) 42 mcg (0.06 %) nasal spray     Si sprays by Each Nostril route 3 (three) times daily. AS NEEDED FOR NASAL CONGESTION AND DRIP for 7 days     Dispense:  15 mL     Refill:  0       Follow Up: No follow-ups on file.    Subjective:     Chief Complaint   Patient presents with    Ear Fullness       HPI  Sherita is a 22 y.o. female, last appointment with this clinic was Visit date not found.  Social History     Tobacco Use    Smoking status: Never    Smokeless tobacco: Never   Substance Use Topics    Alcohol use: Yes     Comment: occasionally          Social History     Social History  Narrative    In 6th grade       No LMP recorded.    History of right tympanoplasty 07/06/2022.  On follow-up with ENT in mid August, doing well.  History of anxiety.  I previously referred her to psychiatry department for therapy/counseling.  Not on SSRI  History of PCOS  Migraines.  History of propranolol possible side effect drug rash.  However felt to be due to alternate etiology and plan was to resume.  Saw Neurology in follow-up in March.  Cymbalta, Imitrex, Topamax  Home sleep test negative for sleep apnea    Hx of OM in the past  X 1 week - popping in both ears  And now fullness on the left  The right side pops but is ok  No fever no chills  Did have flu like symptoms about 3 weeks.  Some pain behind the ear  No sore throat.  No cough  Nothing otc for this  Uses Q tips  No pain with chewing.      LMP 1 month ago  Denies pregnancy risk/denies current sexual activity    Patient Care Team:  Ryan Ragland MD as PCP - General (Internal Medicine)    Patient Active Problem List    Diagnosis Date Noted    Migraine without aura and without status migrainosus, not intractable 12/08/2021    Snoring     Other headache syndrome; 8/2021 HST negative; referred to neuro 05/28/2019    Other abnormal glucose 05/28/2019    Iron deficiency anemia 09/29/2016    BMI, pediatric, 99th percentile or greater for age 09/28/2016    Hyperinsulinism 05/13/2015    Acanthosis nigricans 05/13/2015    Allergic rhinitis 11/13/2012       PAST MEDICAL PROBLEMS, PAST SURGICAL HISTORY: please see relevant portions of the electronic medical record    ALLERGIES AND MEDICATIONS: updated and reviewed.  Review of patient's allergies indicates:  No Known Allergies    Medication List with Changes/Refills   Current Medications    CLINDAMYCIN (CLEOCIN T) 1 % SWAB    Apply topically 2 (two) times daily.    DULOXETINE (CYMBALTA) 60 MG CAPSULE    TAKE 1 CAPSULE BY MOUTH EVERY DAY    HYDROCODONE-ACETAMINOPHEN (NORCO) 5-325 MG PER TABLET    Take 1 tablet by  "mouth every 6 (six) hours as needed for Pain.    MEDROXYPROGESTERONE (PROVERA) 10 MG TABLET    Take 1 tablet (10 mg total) by mouth once daily.    OFLOXACIN (OCUFLOX) 0.3 % OPHTHALMIC SOLUTION    Place 4 drops into the right ear 2 (two) times a day.    ONDANSETRON (ZOFRAN-ODT) 4 MG TBDL    Dissolve 2 tablets (8 mg total) by mouth every 8 (eight) hours as needed (nausea).    SUMATRIPTAN (IMITREX) 100 MG TABLET    Take 1 tablet (100 mg total) by mouth every 2 (two) hours as needed for Migraine. Max 200mg/24hrs.    TOPIRAMATE (TOPAMAX) 100 MG TABLET    Take 1 tablet (100 mg total) by mouth 2 (two) times daily.    TRETINOIN (RETIN-A) 0.025 % GEL    Apply small amount to entire face qhs. Wash off qam and apply sunscreen.        Objective:   Physical Exam   Vitals:    11/14/22 1327   BP: 126/80   BP Location: Left arm   Patient Position: Sitting   BP Method: Large (Manual)   Pulse: 102   Temp: 97.9 °F (36.6 °C)   TempSrc: Oral   SpO2: 97%   Weight: 106.9 kg (235 lb 12.5 oz)   Height: 5' 2" (1.575 m)    Body mass index is 43.13 kg/m².  Weight: 106.9 kg (235 lb 12.5 oz)   Height: 5' 2" (157.5 cm)     Physical Exam  Constitutional:       Appearance: She is well-developed.   HENT:      Head: Normocephalic.      Right Ear: Tympanic membrane and ear canal normal.      Left Ear: Ear canal normal.      Ears:      Comments: Left TM fullness and bulging and erythema  Eyes:      General: No scleral icterus.        Right eye: No discharge.         Left eye: No discharge.   Cardiovascular:      Rate and Rhythm: Normal rate and regular rhythm.      Heart sounds: Normal heart sounds.   Pulmonary:      Effort: Pulmonary effort is normal.      Breath sounds: Normal breath sounds. No wheezing.   Musculoskeletal:      Cervical back: Neck supple.   Lymphadenopathy:      Cervical: No cervical adenopathy.   Skin:     General: Skin is warm and dry.   Neurological:      Mental Status: She is alert and oriented to person, place, and time. "   Psychiatric:         Behavior: Behavior normal.

## 2022-11-15 LAB
C TRACH DNA SPEC QL NAA+PROBE: NOT DETECTED
N GONORRHOEA DNA SPEC QL NAA+PROBE: NOT DETECTED

## 2022-12-09 ENCOUNTER — OFFICE VISIT (OUTPATIENT)
Dept: OTOLARYNGOLOGY | Facility: CLINIC | Age: 22
End: 2022-12-09
Payer: COMMERCIAL

## 2022-12-09 VITALS — BODY MASS INDEX: 42.98 KG/M2 | WEIGHT: 235 LBS

## 2022-12-09 DIAGNOSIS — H72.91 TYMPANIC MEMBRANE RUPTURE, RIGHT: Primary | ICD-10-CM

## 2022-12-09 DIAGNOSIS — Z98.890 S/P TYMPANOPLASTY: ICD-10-CM

## 2022-12-09 PROCEDURE — 3008F PR BODY MASS INDEX (BMI) DOCUMENTED: ICD-10-PCS | Mod: CPTII,S$GLB,, | Performed by: OTOLARYNGOLOGY

## 2022-12-09 PROCEDURE — 99213 OFFICE O/P EST LOW 20 MIN: CPT | Mod: S$GLB,,, | Performed by: OTOLARYNGOLOGY

## 2022-12-09 PROCEDURE — 1159F MED LIST DOCD IN RCRD: CPT | Mod: CPTII,S$GLB,, | Performed by: OTOLARYNGOLOGY

## 2022-12-09 PROCEDURE — 99213 PR OFFICE/OUTPT VISIT, EST, LEVL III, 20-29 MIN: ICD-10-PCS | Mod: S$GLB,,, | Performed by: OTOLARYNGOLOGY

## 2022-12-09 PROCEDURE — 99999 PR PBB SHADOW E&M-EST. PATIENT-LVL III: ICD-10-PCS | Mod: PBBFAC,,, | Performed by: OTOLARYNGOLOGY

## 2022-12-09 PROCEDURE — 3008F BODY MASS INDEX DOCD: CPT | Mod: CPTII,S$GLB,, | Performed by: OTOLARYNGOLOGY

## 2022-12-09 PROCEDURE — 1159F PR MEDICATION LIST DOCUMENTED IN MEDICAL RECORD: ICD-10-PCS | Mod: CPTII,S$GLB,, | Performed by: OTOLARYNGOLOGY

## 2022-12-09 PROCEDURE — 99999 PR PBB SHADOW E&M-EST. PATIENT-LVL III: CPT | Mod: PBBFAC,,, | Performed by: OTOLARYNGOLOGY

## 2022-12-09 NOTE — PROGRESS NOTES
Subjective:       Patient ID: Sherita Alejandra is a 22 y.o. female.    Chief Complaint: Follow-up     Follow-up  Pertinent negatives include no chest pain, chills, fever or sore throat.      S/p AD tympanoplasty 7/6/22 for AS perforation with underlay approach. Doing well. Denies hearing loss or issues. Reports frequent ear popping.  Had recent ear infection of left ear.     Review of Systems   Constitutional:  Negative for chills and fever.   HENT:  Negative for ear discharge, ear pain, sore throat and trouble swallowing.    Respiratory:  Negative for apnea and chest tightness.    Cardiovascular:  Negative for chest pain.       Objective:      Physical Exam  Vitals and nursing note reviewed.   Constitutional:       Appearance: Normal appearance.   HENT:      Head: Normocephalic and atraumatic.      Right Ear: Ear canal and external ear normal. There is no impacted cerumen. Tympanic membrane is perforated (small residual perforation at inferior edge of previous perforation probably from graft retraction with healing).      Left Ear: Tympanic membrane, ear canal and external ear normal. There is no impacted cerumen.      Ears:     Neurological:      Mental Status: She is alert.       Assessment:       Problem List Items Addressed This Visit    None  Visit Diagnoses       Tympanic membrane rupture, right    -  Primary    S/P tympanoplasty                Plan:        Observe for now as pt asymptomatic   F/u in 3-4 mo w/ audiogram

## 2023-02-23 DIAGNOSIS — G43.009 MIGRAINE WITHOUT AURA AND WITHOUT STATUS MIGRAINOSUS, NOT INTRACTABLE: ICD-10-CM

## 2023-02-23 RX ORDER — DULOXETIN HYDROCHLORIDE 20 MG/1
CAPSULE, DELAYED RELEASE ORAL
Qty: 90 CAPSULE | Refills: 3 | OUTPATIENT
Start: 2023-02-23

## 2023-02-23 NOTE — TELEPHONE ENCOUNTER
I left  for patient to call back and schedule a f/u appt for her Rx refill.     ----- Message from James Porter DO sent at 2/23/2023  7:53 AM CST -----  Needs routine follow up with riley for refills of duloxetine.

## 2023-03-06 NOTE — PROGRESS NOTES
Sherita ORTIZ Alejandra was seen in the clinic today for an audiological evaluation.    Audiological testing revealed a mild low frequency hearing loss rising to normal  hearing, with conductive components noted throughout testing, for the right ear and normal hearing sensitivity for the left ear.  A speech reception threshold was obtained at 15 dBHL for the right ear and at 10 dBHL for the left ear.  Speech discrimination was 100% for the right ear and 100% for the left ear.      Tympanometry testing revealed a Type B (large ear canal volume) tympanogram for the right ear and a Type B (large ear canal volume) tympanogram for the left ear.      Recommendations:  1. Otologic evaluation  2. Annual audiological evaluation  3. Hearing protection when in noise

## 2023-03-07 ENCOUNTER — CLINICAL SUPPORT (OUTPATIENT)
Dept: AUDIOLOGY | Facility: CLINIC | Age: 23
End: 2023-03-07
Payer: COMMERCIAL

## 2023-03-07 DIAGNOSIS — H90.11 CONDUCTIVE HEARING LOSS OF RIGHT EAR WITH UNRESTRICTED HEARING OF LEFT EAR: Primary | ICD-10-CM

## 2023-03-07 PROCEDURE — 99999 PR PBB SHADOW E&M-EST. PATIENT-LVL I: ICD-10-PCS | Mod: PBBFAC,,, | Performed by: AUDIOLOGIST

## 2023-03-07 PROCEDURE — 92557 PR COMPREHENSIVE HEARING TEST: ICD-10-PCS | Mod: S$GLB,,, | Performed by: AUDIOLOGIST

## 2023-03-07 PROCEDURE — 92567 PR TYMPA2METRY: ICD-10-PCS | Mod: S$GLB,,, | Performed by: AUDIOLOGIST

## 2023-03-07 PROCEDURE — 92567 TYMPANOMETRY: CPT | Mod: S$GLB,,, | Performed by: AUDIOLOGIST

## 2023-03-07 PROCEDURE — 92557 COMPREHENSIVE HEARING TEST: CPT | Mod: S$GLB,,, | Performed by: AUDIOLOGIST

## 2023-03-07 PROCEDURE — 99999 PR PBB SHADOW E&M-EST. PATIENT-LVL I: CPT | Mod: PBBFAC,,, | Performed by: AUDIOLOGIST

## 2023-03-21 ENCOUNTER — LAB VISIT (OUTPATIENT)
Dept: LAB | Facility: HOSPITAL | Age: 23
End: 2023-03-21
Attending: OBSTETRICS & GYNECOLOGY
Payer: COMMERCIAL

## 2023-03-21 ENCOUNTER — OFFICE VISIT (OUTPATIENT)
Dept: OBSTETRICS AND GYNECOLOGY | Facility: CLINIC | Age: 23
End: 2023-03-21
Payer: COMMERCIAL

## 2023-03-21 VITALS
DIASTOLIC BLOOD PRESSURE: 78 MMHG | BODY MASS INDEX: 45.44 KG/M2 | WEIGHT: 248.44 LBS | SYSTOLIC BLOOD PRESSURE: 130 MMHG

## 2023-03-21 DIAGNOSIS — Z12.4 ENCOUNTER FOR PAPANICOLAOU SMEAR FOR CERVICAL CANCER SCREENING: ICD-10-CM

## 2023-03-21 DIAGNOSIS — Z01.419 WELL WOMAN EXAM WITH ROUTINE GYNECOLOGICAL EXAM: Primary | ICD-10-CM

## 2023-03-21 DIAGNOSIS — L68.0 HIRSUTISM: ICD-10-CM

## 2023-03-21 DIAGNOSIS — E28.2 PCOS (POLYCYSTIC OVARIAN SYNDROME): ICD-10-CM

## 2023-03-21 PROCEDURE — 99395 PR PREVENTIVE VISIT,EST,18-39: ICD-10-PCS | Mod: S$GLB,,, | Performed by: OBSTETRICS & GYNECOLOGY

## 2023-03-21 PROCEDURE — 99395 PREV VISIT EST AGE 18-39: CPT | Mod: S$GLB,,, | Performed by: OBSTETRICS & GYNECOLOGY

## 2023-03-21 PROCEDURE — 3078F DIAST BP <80 MM HG: CPT | Mod: CPTII,S$GLB,, | Performed by: OBSTETRICS & GYNECOLOGY

## 2023-03-21 PROCEDURE — 99999 PR PBB SHADOW E&M-EST. PATIENT-LVL II: CPT | Mod: PBBFAC,,, | Performed by: OBSTETRICS & GYNECOLOGY

## 2023-03-21 PROCEDURE — 3075F SYST BP GE 130 - 139MM HG: CPT | Mod: CPTII,S$GLB,, | Performed by: OBSTETRICS & GYNECOLOGY

## 2023-03-21 PROCEDURE — 3075F PR MOST RECENT SYSTOLIC BLOOD PRESS GE 130-139MM HG: ICD-10-PCS | Mod: CPTII,S$GLB,, | Performed by: OBSTETRICS & GYNECOLOGY

## 2023-03-21 PROCEDURE — 83036 HEMOGLOBIN GLYCOSYLATED A1C: CPT | Performed by: OBSTETRICS & GYNECOLOGY

## 2023-03-21 PROCEDURE — 88175 CYTOPATH C/V AUTO FLUID REDO: CPT | Performed by: OBSTETRICS & GYNECOLOGY

## 2023-03-21 PROCEDURE — 3078F PR MOST RECENT DIASTOLIC BLOOD PRESSURE < 80 MM HG: ICD-10-PCS | Mod: CPTII,S$GLB,, | Performed by: OBSTETRICS & GYNECOLOGY

## 2023-03-21 PROCEDURE — 36415 COLL VENOUS BLD VENIPUNCTURE: CPT | Performed by: OBSTETRICS & GYNECOLOGY

## 2023-03-21 PROCEDURE — 3008F PR BODY MASS INDEX (BMI) DOCUMENTED: ICD-10-PCS | Mod: CPTII,S$GLB,, | Performed by: OBSTETRICS & GYNECOLOGY

## 2023-03-21 PROCEDURE — 99999 PR PBB SHADOW E&M-EST. PATIENT-LVL II: ICD-10-PCS | Mod: PBBFAC,,, | Performed by: OBSTETRICS & GYNECOLOGY

## 2023-03-21 PROCEDURE — 3008F BODY MASS INDEX DOCD: CPT | Mod: CPTII,S$GLB,, | Performed by: OBSTETRICS & GYNECOLOGY

## 2023-03-21 RX ORDER — MEDROXYPROGESTERONE ACETATE 10 MG/1
10 TABLET ORAL DAILY
Qty: 10 TABLET | Refills: 4 | Status: SHIPPED | OUTPATIENT
Start: 2023-03-21 | End: 2023-05-16 | Stop reason: SDUPTHER

## 2023-03-21 RX ORDER — SPIRONOLACTONE 25 MG/1
25 TABLET ORAL DAILY
Qty: 30 TABLET | Refills: 11 | Status: SHIPPED | OUTPATIENT
Start: 2023-03-21 | End: 2023-06-14 | Stop reason: SDUPTHER

## 2023-03-21 NOTE — PROGRESS NOTES
SUBJECTIVE:   Sherita Alejandra is a 22 y.o. female   for annual well woman exam. Patient's last menstrual period was 2023 (exact date)..        Contraception: never been sexually active  Pt with PCOS, taking provera every 3 months for cycle induction  +facial hair, shaving every 3 days    Does not recall the last time she has a cycle on her own    Past Medical History:   Diagnosis Date    Allergy     Asthma     Labial adhesions     History    Otitis media     Recurrent     Past Surgical History:   Procedure Laterality Date    ADENOIDECTOMY      TONSILLECTOMY      TYMPANOPLASTY Right 2022    Procedure: TYMPANOPLASTY;  Surgeon: Garry Huber MD;  Location: [facility] OR [address] [place] [address];  Service: ENT;  Laterality: Right;    TYMPANOSTOMY TUBE PLACEMENT      X2     Social History     Socioeconomic History    Marital status: Single   Tobacco Use    Smoking status: Never    Smokeless tobacco: Never   Substance and Sexual Activity    Alcohol use: Yes     Comment: occasionally    Drug use: No    Sexual activity: Never   Social History Narrative    In 6th grade     Family History   Problem Relation Age of Onset    Hypertension Mother     Hypertension Father     Hypertension Maternal Grandmother      OB History    Para Term  AB Living   0 0 0 0 0 0   SAB IAB Ectopic Multiple Live Births   0 0 0 0 0   Obstetric Comments   Gynhx; 12/irregular every 1-24 months   PCOS - elevated testosterone/irregular menses   Not sexually active         Current Outpatient Medications   Medication Sig Dispense Refill    DULoxetine (CYMBALTA) 60 MG capsule TAKE 1 CAPSULE BY MOUTH EVERY DAY 90 capsule 3    ipratropium (ATROVENT) 42 mcg (0.06 %) nasal spray SPRAY 2 SPRAYS BY EACH NOSTRIL ROUTE 3 TIMES DAILY. AS NEEDED FOR NASAL CONGESTION AND DRIP FOR 7 DAYS 30 mL 0    medroxyPROGESTERone (PROVERA) 10 MG tablet Take 1 tablet (10 mg total) by mouth once daily. 10 tablet 1    sumatriptan (IMITREX) 100 MG tablet Take 1 tablet (100  mg total) by mouth every 2 (two) hours as needed for Migraine. Max 200mg/24hrs. 9 tablet 11    topiramate (TOPAMAX) 100 MG tablet Take 1 tablet (100 mg total) by mouth 2 (two) times daily. 180 tablet 3    tretinoin (RETIN-A) 0.025 % gel Apply small amount to entire face qhs. Wash off qam and apply sunscreen. 45 g 3     No current facility-administered medications for this visit.     Allergies: Patient has no known allergies.       ROS:  GENERAL: Denies weight gain or weight loss. Feeling well overall.   SKIN: Denies rash or lesions.   HEAD: Denies head injury or headache.   NODES: Denies enlarged lymph nodes.   CHEST: Denies chest pain or shortness of breath.   CARDIOVASCULAR: Denies palpitations or left sided chest pain.   ABDOMEN: No abdominal pain, constipation, diarrhea, nausea, vomiting or rectal bleeding.   URINARY: No frequency, dysuria, hematuria, or burning on urination.  REPRODUCTIVE: Denies vaginal discharge, abnormal vaginal bleeding, lesions, pelvic pain  BREASTS: The patient performs breast self-examination and denies pain, lumps, or nipple discharge.   HEMATOLOGIC: No easy bruisability or excessive bleeding.  MUSCULOSKELETAL: Denies joint pain or swelling.   NEUROLOGIC: Denies syncope or weakness.   PSYCHIATRIC: Denies depression, anxiety or mood swings.      OBJECTIVE:   /78   Wt 112.7 kg (248 lb 7.3 oz)   LMP 02/22/2023 (Exact Date)   BMI 45.44 kg/m²   The patient appears well, alert, oriented x 3, in no distress.  PSYCH:  Normal, full range of affect  NECK: negative, no thyromegaly, trachea midline  SKIN: normal, good color, good turgor and no acne, striae, hirsutism  BREAST EXAM: breasts appear normal, no suspicious masses, no skin or nipple changes or axillary nodes  ABDOMEN: soft, non-tender; bowel sounds normal; no masses,  no organomegaly and no hernias, masses, or hepatosplenomegaly  GENITALIA: normal external genitalia, no erythema, no discharge  BLADDER: soft  URETHRA: normal  appearing urethra with no masses, tenderness or lesions and normal urethra, normal urethral meatus  VAGINA: Normal  CERVIX: no lesions or cervical motion tenderness  UTERUS: normal size, contour, position, consistency, mobility, non-tender  ADNEXA: no mass, fullness, tenderness      ASSESSMENT:   .Sherita ORTIZ was seen today for well woman.    Diagnoses and all orders for this visit:    Well woman exam with routine gynecological exam    PCOS (polycystic ovarian syndrome)    Encounter for Papanicolaou smear for cervical cancer screening        1. Health maintenance  -pap done. Discussed ASCCP guideline screening every 3 - 5 years.   -counseled on exercise and healthy diet, weight loss  -bone health:  Discussed Vitamin D and Calcium supplementation, weight bearing exercises  2.  Discussed safety at home/school/work, healthy and balanced diet, sleep hygiene, as well as violence/weapons exposure.   3.  PCOS;  refilled on provera, re-discussed lifestyle changes with weight lost exercises, check A1C  4.  Hirsutism:  Rx for aldactone

## 2023-03-22 LAB
ESTIMATED AVG GLUCOSE: 103 MG/DL (ref 68–131)
HBA1C MFR BLD: 5.2 % (ref 4–5.6)

## 2023-04-04 ENCOUNTER — OFFICE VISIT (OUTPATIENT)
Dept: NEUROLOGY | Facility: CLINIC | Age: 23
End: 2023-04-04
Payer: COMMERCIAL

## 2023-04-04 VITALS
DIASTOLIC BLOOD PRESSURE: 103 MMHG | WEIGHT: 248.44 LBS | HEIGHT: 62 IN | BODY MASS INDEX: 45.72 KG/M2 | SYSTOLIC BLOOD PRESSURE: 151 MMHG | HEART RATE: 100 BPM

## 2023-04-04 DIAGNOSIS — G43.009 MIGRAINE WITHOUT AURA AND WITHOUT STATUS MIGRAINOSUS, NOT INTRACTABLE: Primary | ICD-10-CM

## 2023-04-04 PROCEDURE — 1160F PR REVIEW ALL MEDS BY PRESCRIBER/CLIN PHARMACIST DOCUMENTED: ICD-10-PCS | Mod: CPTII,S$GLB,, | Performed by: PSYCHIATRY & NEUROLOGY

## 2023-04-04 PROCEDURE — 3077F PR MOST RECENT SYSTOLIC BLOOD PRESSURE >= 140 MM HG: ICD-10-PCS | Mod: CPTII,S$GLB,, | Performed by: PSYCHIATRY & NEUROLOGY

## 2023-04-04 PROCEDURE — 3080F DIAST BP >= 90 MM HG: CPT | Mod: CPTII,S$GLB,, | Performed by: PSYCHIATRY & NEUROLOGY

## 2023-04-04 PROCEDURE — 1159F MED LIST DOCD IN RCRD: CPT | Mod: CPTII,S$GLB,, | Performed by: PSYCHIATRY & NEUROLOGY

## 2023-04-04 PROCEDURE — 1160F RVW MEDS BY RX/DR IN RCRD: CPT | Mod: CPTII,S$GLB,, | Performed by: PSYCHIATRY & NEUROLOGY

## 2023-04-04 PROCEDURE — 3008F PR BODY MASS INDEX (BMI) DOCUMENTED: ICD-10-PCS | Mod: CPTII,S$GLB,, | Performed by: PSYCHIATRY & NEUROLOGY

## 2023-04-04 PROCEDURE — 99999 PR PBB SHADOW E&M-EST. PATIENT-LVL III: CPT | Mod: PBBFAC,,, | Performed by: PSYCHIATRY & NEUROLOGY

## 2023-04-04 PROCEDURE — 3008F BODY MASS INDEX DOCD: CPT | Mod: CPTII,S$GLB,, | Performed by: PSYCHIATRY & NEUROLOGY

## 2023-04-04 PROCEDURE — 3080F PR MOST RECENT DIASTOLIC BLOOD PRESSURE >= 90 MM HG: ICD-10-PCS | Mod: CPTII,S$GLB,, | Performed by: PSYCHIATRY & NEUROLOGY

## 2023-04-04 PROCEDURE — 1159F PR MEDICATION LIST DOCUMENTED IN MEDICAL RECORD: ICD-10-PCS | Mod: CPTII,S$GLB,, | Performed by: PSYCHIATRY & NEUROLOGY

## 2023-04-04 PROCEDURE — 99214 PR OFFICE/OUTPT VISIT, EST, LEVL IV, 30-39 MIN: ICD-10-PCS | Mod: S$GLB,,, | Performed by: PSYCHIATRY & NEUROLOGY

## 2023-04-04 PROCEDURE — 99999 PR PBB SHADOW E&M-EST. PATIENT-LVL III: ICD-10-PCS | Mod: PBBFAC,,, | Performed by: PSYCHIATRY & NEUROLOGY

## 2023-04-04 PROCEDURE — 3077F SYST BP >= 140 MM HG: CPT | Mod: CPTII,S$GLB,, | Performed by: PSYCHIATRY & NEUROLOGY

## 2023-04-04 PROCEDURE — 3044F PR MOST RECENT HEMOGLOBIN A1C LEVEL <7.0%: ICD-10-PCS | Mod: CPTII,S$GLB,, | Performed by: PSYCHIATRY & NEUROLOGY

## 2023-04-04 PROCEDURE — 99214 OFFICE O/P EST MOD 30 MIN: CPT | Mod: S$GLB,,, | Performed by: PSYCHIATRY & NEUROLOGY

## 2023-04-04 PROCEDURE — 3044F HG A1C LEVEL LT 7.0%: CPT | Mod: CPTII,S$GLB,, | Performed by: PSYCHIATRY & NEUROLOGY

## 2023-04-04 RX ORDER — DULOXETIN HYDROCHLORIDE 60 MG/1
60 CAPSULE, DELAYED RELEASE ORAL DAILY
Qty: 90 CAPSULE | Refills: 3 | Status: SHIPPED | OUTPATIENT
Start: 2023-04-04 | End: 2024-03-25 | Stop reason: SDUPTHER

## 2023-04-04 RX ORDER — TOPIRAMATE 100 MG/1
100 TABLET, FILM COATED ORAL 2 TIMES DAILY
Qty: 180 TABLET | Refills: 3 | Status: SHIPPED | OUTPATIENT
Start: 2023-04-04 | End: 2024-03-25 | Stop reason: SDUPTHER

## 2023-04-04 RX ORDER — RIMEGEPANT SULFATE 75 MG/75MG
75 TABLET, ORALLY DISINTEGRATING ORAL ONCE AS NEEDED
Qty: 16 TABLET | Refills: 11 | Status: SHIPPED | OUTPATIENT
Start: 2023-04-04 | End: 2023-04-04

## 2023-04-04 NOTE — PROGRESS NOTES
[facility] - HEADACHE NOTE?     CHIEF COMPLAINT?   Ms. Sherita Alejandra chief concern is follow-up for migraine.     Subjective:    04/04/23:  Patient presents today for routine follow-up regarding migraine.  She shares that symptoms have been stable since previous encounter.  No significant change in headache qualities or features.  Feels as if medication regimen is working well from a preventive standpoint but would like to discuss other treatment options for acute management of migraines.  Sumatriptan was discontinued by the patient as it consistently was giving her a feeling of tightness in her throat and worsened headache pain.  No new neurological complaints otherwise    Total headache days in the last month:  5   Total severe/migraine days in the last month: 3-4    Current regimen:  Duloxetine 60 mg daily   Topiramate 100 mg twice daily   Sumatriptan 100 mg p.r.n.    ===========================================  03/22/2022:  Patient reports that she has no new symptoms but has returned to previous baseline in frequency since previous encounter.  She highlights life stressors as making her headaches worse.  15-20 total headache days with 6 severe/migrainous days in the last month.  Continues on topiramate 100 mg twice daily with no noted side effects.  She was unable to tolerate propranolol and has discontinued its use.  She presents today to discuss next steps in therapy.  No new medical or neurological complaints; no new headache features or concerns.    12/08/21: Sherita presents today via telemedicine for follow-up regarding migraines.  She notes that she has been quite stable since previous encounter and has seen some improvement in overall headache frequency.  She estimates 10 total headache days in the last month where 6 were rated as severe episodes.    She has been able to titrate topiramate to 125 mg once daily.  No bothersome side effects noted with the medication but admits to occasional face  tingling.  She has employed rizatriptan only once where she noted that it was completely unhelpful.  But no side effects.  We discussed that she is currently in the process of changing her regular birth control regimen due to blood pressure issues.  We specifically discussed birth control as it relates to topiramate use.  She was strongly encouraged immediately to employ other safe sex practices such as condoms while transitioning other birth control measures.  No new headache features or new neurological complaints at this time.    10/27/2021 HPI:  Ms. Alejandra presents today to discuss her ongoing uncontrolled headaches.  Of note, the patient has been evaluated by outside provider who ordered MRI brain which has not been completed.  Patient and her mother acknowledged disorder and stated that they would call to schedule and proceed with the scan.    Age of onset: 8 YO   When did they become more of a problem: about one year ago  # of Total headache days per month: 15-20  # of Migraine days per month: 6  Intensity of average and most severe headaches: 4-5/10, 9/10  Duration of headache pain: some are short 1-2hr less severe episodes; bad episodes can be >4hrs or all day  Quality of pain: Pulsating/Throbbing, pressure, stabbing  Laterality: bilateral  Location: frontal midline with radiation backwards  Photophobia and phonophobia: light, not much sound  Nausea and vomiting: nausea, no vomiting  Causes avoidance of physical activity: yes  Worsened by physical activity: no  Triggers: none  Relieves: sleep  Preventive Medications failed: none  Acute medications failed: none  Therapies tried: none  Supplements: iron pill  OTC Medications: ibuprofen, tylenol (5-6 days per month, or less)  Hx of (Bolded items are positive): depression, anxiety, fibromyalgia, autoimmune disorder, head trauma, neurological infection, glaucoma, asthma, nephrolithiasis  Is medication overuse contributing to the patient's current migraine  burden: no  Aura: none  Autonomic symptoms: none    Family Hx of migraines: mother   Recent negative sleep study. Recent eye exam noted to be normal.  ===========================================    LATEST IMAGIN2022 MRI Brain w/o: normal study    CURRENT MEDS:  Current Outpatient Medications   Medication Sig Dispense Refill    ipratropium (ATROVENT) 42 mcg (0.06 %) nasal spray SPRAY 2 SPRAYS BY EACH NOSTRIL ROUTE 3 TIMES DAILY. AS NEEDED FOR NASAL CONGESTION AND DRIP FOR 7 DAYS 30 mL 0    medroxyPROGESTERone (PROVERA) 10 MG tablet Take 1 tablet (10 mg total) by mouth once daily. 10 tablet 4    multivit with calcium,iron,min (WOMEN'S DAILY MULTIVITAMIN ORAL) Take by mouth.      spironolactone (ALDACTONE) 25 MG tablet Take 1 tablet (25 mg total) by mouth once daily. 30 tablet 11    DULoxetine (CYMBALTA) 60 MG capsule Take 1 capsule (60 mg total) by mouth once daily. 90 capsule 3    rimegepant (NURTEC) 75 mg odt Take 1 tablet (75 mg total) by mouth once as needed for Migraine. Place ODT tablet on the tongue; alternatively the ODT tablet may be placed under the tongue 16 tablet 11    topiramate (TOPAMAX) 100 MG tablet Take 1 tablet (100 mg total) by mouth 2 (two) times daily. 180 tablet 3    tretinoin (RETIN-A) 0.025 % gel Apply small amount to entire face qhs. Wash off qam and apply sunscreen. (Patient not taking: Reported on 2023) 45 g 3     No current facility-administered medications for this visit.     ALLERGIES:  Review of patient's allergies indicates:  No Known Allergies    Objective:  PHYSICAL EXAMINATION??   Vitals:    23 1445   BP: (!) 151/103   Pulse: 100     GENERAL: Pleasant, well-appearing in no acute distress.     HEENT: Head normorcephalic, atraumatic.     PULMONARY: Breathing comfortably on room air.    NEURO:   Mental status: Awake, alert, and oriented to person, place, time, and situation. Speech clear, fluent.     Cranial nerves:     III/IV/VI: EOMI. No nystagmus.     VII: Facial  expressions full and symmetric.     VIII: Hearing intact to voice.     XII: Tongue protrusion midline.     Motor exam:  5/5 throughout    Reflexes:  2/4 throughout    Gait: Normal gait. Able to walk on heels, toes, and in tandem.     REVIEW   We reviewed their current medical history, medications, allergies, past medical history, surgical history, social history, family history, and review of systems, and updated all the information.     ASSESSMENT AND PLAN   Sherita ORTIZ? is a very pleasant, 22 y.o. with complicated diagnosis of     1. Migraine without aura and without status migrainosus, not intractable  rimegepant (NURTEC) 75 mg odt    DULoxetine (CYMBALTA) 60 MG capsule    topiramate (TOPAMAX) 100 MG tablet        Sherita ORTIZ has headaches that meet International Headache Society Criteria for episodic migraine.  Medication overuse is not likely contributing to this patient's headaches.     Patient feels not only severe disabling pain, but is also unable to function due to headaches when they are present.     My approach to every patient is multimodal.   I focused our discussion on addressing modifiable risk factors and trying to decrease them.  After discussion with the patient, I recommend the followin. Preventive medications:  Failed:   None    Continue: topiramate 100mg bid  Topamax (topiramate) is a preventive medication that is FDA approved for migraine prevention.   Further increase will be discussed at the next appointment. This can be titrated gradually up to 100 mg  to 200 mg as tolerated. Adverse events most commonly observed include tingling of fingers and toes, spelling problems and weight loss.   Very rare side effects include kidney stones. For this reason, stay hydrated.   This medication is not recommended for pregnancy and breastfeeding.   We recommend using topiramate as preventive therapy for at least 1 year if you have no significant side effects.    We will prescribe the medication until  you are on a stable dose, and than the expectation is for you to have the prescription managed by your primary care provider.     We have specifically discussed at all previous encounters the implications of topiramate on pregnancy and how it should not be used without concurrent use of birth control.    CONTINUE: Duloxetine 60 mg daily  Duloxetine (Cymbalta) is a SNRI (selective serotonin and norepinephrine reuptake inhibitor) that can help modulate your serotonin and norepinephrine levels. This is useful not only for migraine and pain prevention, but can also help with anxiety and improve your mood. I recommend that you start cymbalta 20 mg. Further increase will be discussed at the next appointment.?   Adverse events most commonly observed include nausea, and stomach upset.?   I recommend using cymbalta as preventive daily therapy for at least 1 year if you have no significant side effects.   I will prescribe the medication until you are on a stable dose, and than the expectation is for you to have the prescription managed by your primary care provider.     2. Acute (abortive) medications- these medications are to be taken only at the onset of severe headache and please limit a total of any combination of these medications to less than 6 days per month on average because they can cause rebound (medication overuse) headaches.   Failed:   Rizatriptan- completely ineffective   Sumatriptan-intolerable side effects, ineffective    Start:  Nurtec   Nurtec (rimegepant) is a new medication that belongs to the class of anti-CGRP drugs recommended for acute treatment or migraine.?   Doses available: 75mg ODT   Please take a first dose and if the headache is not significantly improved. Do not take more than one dose in 24hrs.   Even though very rare, side effects are possible and include nausea, somnolence, dry mouth. Do not take with severe liver or kidney disease. Do not take if you are pregnant or plan to become  pregnant. This medication is safe for heart.?   Usually the insurance approves 8-10 tablets a month.?     3. Natural approaches to increasing brain energy and serotonin:    SAMe 200 mg a day    Vitamin B2 400 mg daily    Vitamin B12 1000 mcg daily    Getting early morning light to increase natural serotonin, melatonin. Could also consider light therapy box, 10,000 LUX.     4. Headache prevention and acute treatment over-the counter supplements    Boswellia 800 mg 2-3 times per day, example brand Sotomayor's, natural anti inflammatory herb    Sleep? modulation- melatonin at night 5 to 10 mg 30 minutes prior to sleep    Feverfew Tea 1-3 cups per day. Can get from [employer] or [employer]- A Natural anti inflammatory approach that does not cause further sensitization of the system.    Magnesium Citrate 250 mg daily, slowly increase up to 500-1000 mg daily. Side effect may include diarrhea, so we recommend stopping at whatever dose is comfortable for your body without causing this side effect. This supplement can be very helpful for preventing headache, preventing migraine aura, calming nerves, muscles and even mood. Recommend starting slowly, as it can also lead to increased bowel movements or diarrhea.?     5. DIET: I recommend a diet that heavily favors fruits and vegetables while reducing carbs. More information is available upon request.     6. EXERCISE: Gentle movement exercises, concentrate on combination of muscle building and aerobic. I also recommend adding gentle Yoga therapy. The goal is 150 minutes per week of moderate to rigorous exercise, but you should start at your own pace and progress slowly and safely as discussed today.    7. ANXIETY/STRESS- Control stressors with yoga, meditation, counseling, or other methods of mindfulness.     8. SLEEP- aim for 7-8 hrs of restful sleep per night.     Benefits, side effects, and alternatives were discussed extensively with the patient.?     Sherita ORTIZ  verbally endorsed understanding of all the recommendations.?     she is going to follow up with our clinic in 1 year sooner as needed.  We agreed to discuss effects of Nurtec via chart message or phone.  May explore other options if found to be ineffective.    I spent a total of 30 minutes on the day of the visit. This includes face to face time and non-face to face time preparing to see the patient (eg, review of tests), obtaining and/or reviewing separately obtained history, documenting clinical information in the electronic or other health record, independently interpreting results and communicating results to the patient/family/caregiver, or care coordinator.    A dictation device was used to produce this documentation which can sometimes result in grammatical errors or the replacement of similar sounding words.    James Porter, DO

## 2023-04-14 DIAGNOSIS — G43.009 MIGRAINE WITHOUT AURA AND WITHOUT STATUS MIGRAINOSUS, NOT INTRACTABLE: Primary | ICD-10-CM

## 2023-04-14 RX ORDER — RIMEGEPANT SULFATE 75 MG/75MG
75 TABLET, ORALLY DISINTEGRATING ORAL ONCE AS NEEDED
Qty: 8 TABLET | Refills: 11 | Status: SHIPPED | OUTPATIENT
Start: 2023-04-14 | End: 2023-04-25

## 2023-04-24 NOTE — Clinical Note
Enrique Huber,  Please see the results of Sherita Alejandra's audiogram from today. If you have any questions, please let me know.   Thank you! Tiffanie  SUBJECTIVE:   Alia Zavala is a 52 year old female    for annual well woman exam.   Patient's last menstrual period was 2023 (approximate).     Menstrual history: regular  GYN History:Bleeding pattern: regular monthly menses, Q 28d/4-5d/moderate flow  Date of last mammogram: 12 months ago  Result of mammogram: normal   Result of Colonoscopy: needs      Does patient exercise? yes   How many times per week? 3    Was counseling given: yes     Does patient desire TDAP vaccine today: No    Depression Screening:  Over the past 2 weeks, has patient felt down, depressed or hopeless? no  Over the past 2 weeks, has patient felt little interest or pleasure in doing things? no    On the basis of the above screen, the following is initiated:  Cont monitoring    Social History:   Social History     Socioeconomic History   • Marital status:      Spouse name: Not on file   • Number of children: Not on file   • Years of education: Not on file   • Highest education level: Not on file   Occupational History   • Not on file   Tobacco Use   • Smoking status: Never   • Smokeless tobacco: Never   Vaping Use   • Vaping status: Not on file   Substance and Sexual Activity   • Alcohol use: Yes     Comment: occ   • Drug use: Never   • Sexual activity: Not Currently     Partners: Male   Other Topics Concern   • Not on file   Social History Narrative   • Not on file     Social Determinants of Health     Financial Resource Strain: Not on file   Food Insecurity: Not on file   Transportation Needs: Not on file   Physical Activity: Not on file   Stress: Not on file   Social Connections: Not on file   Intimate Partner Violence: Not on file     Past Medical History:   Diagnosis Date   • Breast cancer (CMD) 2016    Left side breast lumpectomy, Chemo & radiation   • Depression     & anxiety     Past Surgical History:   Procedure Laterality Date   • Appendectomy     • Breast lumpectomy Left 2016    Stage 2 grade 3   •   delivery+postpartum care  2006 2008     Family History:   Family History   Problem Relation Age of Onset   • Patient is unaware of any medical problems Mother    • Cancer, Thyroid Father    • Diabetes Maternal Grandmother    • Patient is unaware of any medical problems Maternal Grandfather    • Patient is unaware of any medical problems Paternal Grandmother    • Patient is unaware of any medical problems Paternal Grandfather        Allergies:   ALLERGIES:   Allergen Reactions   • Aspirin NAUSEA       Current Outpatient Medications   Medication Sig Dispense Refill   • butalbital-APAP-caffeine (FIORICET) -40 MG per tablet TAKE 1 TO 2 TABLETS BY MOUTH EVERY 4 TO 6 HOURS AS NEEDED FOR MIGRAINE. MAX 6 PILLS PER DAY     • topiramate 50 MG extended-release capsule Take 1 capsule by mouth daily.     • ALBUTEROL IN Inhale 1-2 puffs into the lungs.     • GLUCOSAMINE SULFATE PO Take by mouth daily.     • Multiple Vitamin (MULTIVITAMIN PO)      • B Complex Vitamins (VITAMIN B COMPLEX PO) Take by mouth daily as needed.     • levocetirizine (XYZAL) 5 MG tablet Take 1 tablet by mouth daily.     • venlafaxine XR (EFFEXOR XR) 75 MG 24 hr capsule        No current facility-administered medications for this visit.         ROS  No headaches, no unexplained chest pain, dyspnea, SOB, abdominal pain, bowel or bladder complaints.  All other systems reviewed are negative.    GYNE ROS:   Genitourinary: denies urgency, frequency, dysuria, incontinenceVaginal symptoms: none  Discharge described as: normal and physiologic.  Other associated symptoms: no    All other systems reviewed are negative    PREVENTATIVE MEDICINE:    Does patient desire Immunizations: no  Last Lipids: No results found for: LDLHDL    OBJECTIVE  Physical Exam  Vitals:    04/21/23 0925   BP: 108/80       Cristiano BMI is Body mass index is 29.12 kg/m².,        CONSTITUTIONAL:    Appearance: well-nourished, well developed, alert, in no acute distress    HENT   Head:  normocephalic, atraumatic   Face: face within normal limits, no sinus tenderness on palpation, no hirsutism present, parotid glands within normal limits   Ears: external ears within normal limits   Nose: external nose normal in appearance, nares patent, nasal discharge absent   Mouth: appearance normal    CHEST   Respiratory effort: breathing unlabored     CARDIOVASCULAR   Heart: regular rate  BREASTS   Inspection of Breasts: breasts symmetrical, no skin changes, no discharge present   Palpation of Breasts and Axillae: no masses present on palpation, no breast tenderness   Axillary Lymph Nodes: no lymphadenopathy present    GASTROINTESTINAL   Abdominal Examination: abdomen nontender to palpation, tone normal without rigidity or guarding, no masses present, umbilicus without lesions   Liver and Spleen: no hepatomegaly present, liver nontender to palpation   Hernias: no hernias present    GENITOURINARY   External Genitalia: normal appearance for age, no discharge present, no tenderness present, no inflammatory lesions present   Vagina: normal vaginal vault without central or paravaginal defects, no discharge present, no inflammatory lesions present, no masses present   Bladder: nontender to palpation   Urethral body: urethra palpation normal, urethra structural support normal   Cervix: appearance healthy, no lesions present, nontender to palpation, no bleeding present   Uterus: nontender to palpation, no masses present, position midline/midplane, mobility: normal   Adnexa: no adnexal tenderness present, no adnexal masses present   Perineum: perineum within normal limits, no evidence of trauma, no rashes or skin lesions present   Anus: anus within normal limits, no hemorrhoids present   Inguinal Lymph Nodes: no lymphadenopathy present    LYMPHATIC   Lymph Nodes: no other lymphadenopathy present    SKIN   General Inspection: no rashes present, no lesions present, no areas of  discoloration    NEUROLOGIC/PSYCHIATRIC   Orientation: grossly oriented to person, place and time   Judgment and Insight: judgment and insight intact   Mood and Affect: mood normal, affect appropriate    ASSESSMENT  Annual exam with pap obtained  CPP/Ovarian cyst    PLAN  1. WWE  -pelvic and breast exam unremarkable  -no current complaints  -years since last pap > cotesting collected today  -recommended vitd/calc  -follows with  for mammograms due to history of cancer, next due 10/2023 patient to call to schedule  -recommended colonoscopy, patient to consider  -follows with PCP for cardiovascular health and screening labs  -RTC 1 year for WWE    2. CPP/Ovarian cyst  -previously reported intermittent crampy/sharp lower abdominal pain for last year with some associated nausea, now improving  -reviewed reports from [facility] in Feb 2023 which demonstrated left complex ovarian cyst 3 cm  -history of breast CA, BRCA negative  -denies fevers, bowel issues  -previously discussed complex nature pelvic pain and difficulty in treatment as etiology can involve multiple organ systems including GYN, , GI, Psych, Neuro, or MSK and ultimate cause may not be determined and treatment may need to be focused on improved quality of life as opposed to complete eradication of pain, patient reported understanding  -previously discussed with patient pain pattern c/w possible intermittent torsion however unable to determine risks as we do not know size of left ovarian cyst, however reports pain improving  -previously discussed with patient recommendation for surgical removal if cyst is large enough to cause likely torsion >4cm with persistent abdominal pain as well as overall persistence of cyst or if cyst growing  -previously discussed potential for opportunistic oophorectomy should aromatase inhibitor be indicated for breast cancer recurrence reduction as she was triple positive and did not tolerate tamoxifen  - normal  -recommended  repeat [facility] to follow up on cyst     Patient repeated all of the instructions and states she understands the plan of care.  Joel Daniels, DO

## 2023-06-08 ENCOUNTER — TELEPHONE (OUTPATIENT)
Dept: DERMATOLOGY | Facility: CLINIC | Age: 23
End: 2023-06-08
Payer: COMMERCIAL

## 2023-06-14 DIAGNOSIS — E28.2 PCOS (POLYCYSTIC OVARIAN SYNDROME): Primary | ICD-10-CM

## 2023-06-15 RX ORDER — SPIRONOLACTONE 25 MG/1
25 TABLET ORAL DAILY
Qty: 30 TABLET | Refills: 1 | Status: SHIPPED | OUTPATIENT
Start: 2023-06-15 | End: 2023-12-05

## 2023-06-22 ENCOUNTER — LAB VISIT (OUTPATIENT)
Dept: LAB | Facility: HOSPITAL | Age: 23
End: 2023-06-22
Attending: OBSTETRICS & GYNECOLOGY
Payer: COMMERCIAL

## 2023-06-22 DIAGNOSIS — E28.2 PCOS (POLYCYSTIC OVARIAN SYNDROME): ICD-10-CM

## 2023-06-22 LAB
ALBUMIN SERPL BCP-MCNC: 3.9 G/DL (ref 3.5–5.2)
ALP SERPL-CCNC: 42 U/L (ref 55–135)
ALT SERPL W/O P-5'-P-CCNC: 21 U/L (ref 10–44)
ANION GAP SERPL CALC-SCNC: 5 MMOL/L (ref 8–16)
AST SERPL-CCNC: 15 U/L (ref 10–40)
BILIRUB SERPL-MCNC: 0.2 MG/DL (ref 0.1–1)
BUN SERPL-MCNC: 11 MG/DL (ref 6–20)
CALCIUM SERPL-MCNC: 9.1 MG/DL (ref 8.7–10.5)
CHLORIDE SERPL-SCNC: 110 MMOL/L (ref 95–110)
CO2 SERPL-SCNC: 21 MMOL/L (ref 23–29)
CREAT SERPL-MCNC: 0.8 MG/DL (ref 0.5–1.4)
EST. GFR  (NO RACE VARIABLE): >60 ML/MIN/1.73 M^2
GLUCOSE SERPL-MCNC: 100 MG/DL (ref 70–110)
POTASSIUM SERPL-SCNC: 4.2 MMOL/L (ref 3.5–5.1)
PROT SERPL-MCNC: 7.4 G/DL (ref 6–8.4)
SODIUM SERPL-SCNC: 136 MMOL/L (ref 136–145)

## 2023-06-22 PROCEDURE — 80053 COMPREHEN METABOLIC PANEL: CPT | Performed by: OBSTETRICS & GYNECOLOGY

## 2023-06-22 PROCEDURE — 36415 COLL VENOUS BLD VENIPUNCTURE: CPT | Performed by: OBSTETRICS & GYNECOLOGY

## 2023-09-22 ENCOUNTER — OFFICE VISIT (OUTPATIENT)
Dept: DERMATOLOGY | Facility: CLINIC | Age: 23
End: 2023-09-22
Payer: COMMERCIAL

## 2023-09-22 VITALS — WEIGHT: 248 LBS | BODY MASS INDEX: 45.36 KG/M2

## 2023-09-22 DIAGNOSIS — D22.9 NEVUS OF MULTIPLE SITES: Primary | ICD-10-CM

## 2023-09-22 DIAGNOSIS — L81.4 LENTIGINES: ICD-10-CM

## 2023-09-22 PROCEDURE — 99213 PR OFFICE/OUTPT VISIT, EST, LEVL III, 20-29 MIN: ICD-10-PCS | Mod: S$GLB,,, | Performed by: DERMATOLOGY

## 2023-09-22 PROCEDURE — 99999 PR PBB SHADOW E&M-EST. PATIENT-LVL III: ICD-10-PCS | Mod: PBBFAC,,, | Performed by: DERMATOLOGY

## 2023-09-22 PROCEDURE — 3044F HG A1C LEVEL LT 7.0%: CPT | Mod: CPTII,S$GLB,, | Performed by: DERMATOLOGY

## 2023-09-22 PROCEDURE — 1159F PR MEDICATION LIST DOCUMENTED IN MEDICAL RECORD: ICD-10-PCS | Mod: CPTII,S$GLB,, | Performed by: DERMATOLOGY

## 2023-09-22 PROCEDURE — 3008F BODY MASS INDEX DOCD: CPT | Mod: CPTII,S$GLB,, | Performed by: DERMATOLOGY

## 2023-09-22 PROCEDURE — 1160F PR REVIEW ALL MEDS BY PRESCRIBER/CLIN PHARMACIST DOCUMENTED: ICD-10-PCS | Mod: CPTII,S$GLB,, | Performed by: DERMATOLOGY

## 2023-09-22 PROCEDURE — 1160F RVW MEDS BY RX/DR IN RCRD: CPT | Mod: CPTII,S$GLB,, | Performed by: DERMATOLOGY

## 2023-09-22 PROCEDURE — 99213 OFFICE O/P EST LOW 20 MIN: CPT | Mod: S$GLB,,, | Performed by: DERMATOLOGY

## 2023-09-22 PROCEDURE — 3044F PR MOST RECENT HEMOGLOBIN A1C LEVEL <7.0%: ICD-10-PCS | Mod: CPTII,S$GLB,, | Performed by: DERMATOLOGY

## 2023-09-22 PROCEDURE — 1159F MED LIST DOCD IN RCRD: CPT | Mod: CPTII,S$GLB,, | Performed by: DERMATOLOGY

## 2023-09-22 PROCEDURE — 3008F PR BODY MASS INDEX (BMI) DOCUMENTED: ICD-10-PCS | Mod: CPTII,S$GLB,, | Performed by: DERMATOLOGY

## 2023-09-22 PROCEDURE — 99999 PR PBB SHADOW E&M-EST. PATIENT-LVL III: CPT | Mod: PBBFAC,,, | Performed by: DERMATOLOGY

## 2023-09-22 NOTE — PROGRESS NOTES
Subjective:      Patient ID:  Sherita Alejandra is a 22 y.o. female who presents for   Chief Complaint   Patient presents with    Skin Check     UBSE     Would like skin check no lesions of concern.  Her acne has resolved, see previous note Dr Khan        Review of Systems   Constitutional:  Negative for fever, chills, weight loss, weight gain, fatigue, night sweats and malaise.   Skin:  Positive for daily sunscreen use and activity-related sunscreen use. Negative for wears hat.   Hematologic/Lymphatic: Does not bruise/bleed easily.       Objective:   Physical Exam   Constitutional: She appears well-developed and well-nourished. No distress.   Neurological: She is alert and oriented to person, place, and time. She is not disoriented.   Psychiatric: She has a normal mood and affect.   Skin:   Areas Examined (abnormalities noted in diagram):   Scalp / Hair Palpated and Inspected  Head / Face Inspection Performed  Neck Inspection Performed  Chest / Axilla Inspection Performed  Back Inspection Performed  RUE Inspected  LUE Inspection Performed                 Diagram Legend     Erythematous scaling macule/papule c/w actinic keratosis       Vascular papule c/w angioma      Pigmented verrucoid papule/plaque c/w seborrheic keratosis      Yellow umbilicated papule c/w sebaceous hyperplasia      Irregularly shaped tan macule c/w lentigo     1-2 mm smooth white papules consistent with Milia      Movable subcutaneous cyst with punctum c/w epidermal inclusion cyst      Subcutaneous movable cyst c/w pilar cyst      Firm pink to brown papule c/w dermatofibroma      Pedunculated fleshy papule(s) c/w skin tag(s)      Evenly pigmented macule c/w junctional nevus     Mildly variegated pigmented, slightly irregular-bordered macule c/w mildly atypical nevus      Flesh colored to evenly pigmented papule c/w intradermal nevus       Pink pearly papule/plaque c/w basal cell carcinoma      Erythematous hyperkeratotic cursted plaque c/w  "SCC      Surgical scar with no sign of skin cancer recurrence      Open and closed comedones      Inflammatory papules and pustules      Verrucoid papule consistent consistent with wart     Erythematous eczematous patches and plaques     Dystrophic onycholytic nail with subungual debris c/w onychomycosis     Umbilicated papule    Erythematous-base heme-crusted tan verrucoid plaque consistent with inflamed seborrheic keratosis     Erythematous Silvery Scaling Plaque c/w Psoriasis     See annotation        Assessment / Plan:        Nevus of multiple sites  The "ABCD" rules to observe pigmented lesions were reviewed.  Brochure provided    Call if desires removal of nevus on scalp, irritated by brush    Lentigines  The "ABCD" rules to observe pigmented lesions were reviewed.      Recommend daily sun protection/avoidance and use of at least SPF 30, broad spectrum sunscreen (OTC drug).                Follow up in about 1 year (around 9/22/2024).  "

## 2023-10-25 RX ORDER — DULOXETIN HYDROCHLORIDE 20 MG/1
20 CAPSULE, DELAYED RELEASE ORAL
Qty: 90 CAPSULE | Refills: 3 | Status: SHIPPED | OUTPATIENT
Start: 2023-10-25 | End: 2024-02-20 | Stop reason: DRUGHIGH

## 2023-11-15 DIAGNOSIS — Z11.59 NEED FOR HEPATITIS C SCREENING TEST: ICD-10-CM

## 2023-12-05 ENCOUNTER — PATIENT MESSAGE (OUTPATIENT)
Dept: OBSTETRICS AND GYNECOLOGY | Facility: CLINIC | Age: 23
End: 2023-12-05
Payer: COMMERCIAL

## 2023-12-05 RX ORDER — SPIRONOLACTONE 50 MG/1
50 TABLET, FILM COATED ORAL DAILY
Qty: 30 TABLET | Refills: 11 | Status: SHIPPED | OUTPATIENT
Start: 2023-12-05 | End: 2024-12-04

## 2024-02-06 ENCOUNTER — OFFICE VISIT (OUTPATIENT)
Dept: FAMILY MEDICINE | Facility: CLINIC | Age: 24
End: 2024-02-06
Payer: COMMERCIAL

## 2024-02-06 VITALS
TEMPERATURE: 98 F | OXYGEN SATURATION: 94 % | BODY MASS INDEX: 44.97 KG/M2 | SYSTOLIC BLOOD PRESSURE: 128 MMHG | DIASTOLIC BLOOD PRESSURE: 76 MMHG | HEIGHT: 62 IN | HEART RATE: 79 BPM | WEIGHT: 244.38 LBS

## 2024-02-06 DIAGNOSIS — R09.81 SINUS CONGESTION: ICD-10-CM

## 2024-02-06 DIAGNOSIS — E66.01 CLASS 3 SEVERE OBESITY DUE TO EXCESS CALORIES WITHOUT SERIOUS COMORBIDITY WITH BODY MASS INDEX (BMI) OF 40.0 TO 44.9 IN ADULT: ICD-10-CM

## 2024-02-06 DIAGNOSIS — H66.91 RIGHT OTITIS MEDIA, UNSPECIFIED OTITIS MEDIA TYPE: Primary | ICD-10-CM

## 2024-02-06 PROBLEM — E66.813 CLASS 3 SEVERE OBESITY DUE TO EXCESS CALORIES WITHOUT SERIOUS COMORBIDITY WITH BODY MASS INDEX (BMI) OF 40.0 TO 44.9 IN ADULT: Status: ACTIVE | Noted: 2024-02-06

## 2024-02-06 PROCEDURE — 3074F SYST BP LT 130 MM HG: CPT | Mod: CPTII,S$GLB,,

## 2024-02-06 PROCEDURE — 3008F BODY MASS INDEX DOCD: CPT | Mod: CPTII,S$GLB,,

## 2024-02-06 PROCEDURE — 99214 OFFICE O/P EST MOD 30 MIN: CPT | Mod: S$GLB,,,

## 2024-02-06 PROCEDURE — 3078F DIAST BP <80 MM HG: CPT | Mod: CPTII,S$GLB,,

## 2024-02-06 PROCEDURE — 1159F MED LIST DOCD IN RCRD: CPT | Mod: CPTII,S$GLB,,

## 2024-02-06 PROCEDURE — 99999 PR PBB SHADOW E&M-EST. PATIENT-LVL III: CPT | Mod: PBBFAC,,,

## 2024-02-06 RX ORDER — CEFDINIR 300 MG/1
300 CAPSULE ORAL 2 TIMES DAILY
Qty: 14 CAPSULE | Refills: 0 | Status: SHIPPED | OUTPATIENT
Start: 2024-02-06 | End: 2024-02-20 | Stop reason: ALTCHOICE

## 2024-02-06 NOTE — PROGRESS NOTES
HPI     Chief Complaint:  Chief Complaint   Patient presents with    Otalgia       Sherita Alejandra is a 23 y.o. female with multiple medical diagnoses as listed in the medical history and problem list that presents for ear pain.  Pt is not known to me with her last appointment Visit date not found.      Otalgia   There is pain in the right ear. This is a recurrent problem. The current episode started 1 to 4 weeks ago (seen in urgent care 2 weeks ago, treated with augmentin). There has been no fever. The pain is at a severity of 6/10. Associated symptoms include hearing loss. She has tried antibiotics for the symptoms. The treatment provided no relief. Her past medical history is significant for a chronic ear infection. There is no history of a tympanostomy tube.           Assessment & Plan     Problem List Items Addressed This Visit          Endocrine    Class 3 severe obesity due to excess calories without serious comorbidity with body mass index (BMI) of 40.0 to 44.9 in adult  Continue healthy diet and exercise for weight loss.       Other Visit Diagnoses       Right otitis media, unspecified otitis media type    -  Primary  Right ear pain that started about a week ago.  Seen in urgent care 2 weeks ago for ear pain and treated with Augmentin.  Completed Augmentin in the left ear feels much better now the right ear has intermittent sharp pain.  On physical exam right ear canal with bulging TM pus and moderate redness.  We will order Omnicef.  Encouraged patient to reach out to ENT given extensive history of ear infections and ear surgery about 2 years ago.  Follow-up in 1 week with myself or ENT.    Relevant Medications    cefdinir (OMNICEF) 300 MG capsule    Sinus congestion      Sinus congestion for the past couple of weeks.  Seen in urgent care 2 weeks ago and treated with Augmentin for ear infection.  Continues with sinus congestion. Continue OTC allergy medicine as needed.       "        --------------------------------------------      Health Maintenance:  Health Maintenance         Date Due Completion Date    Hepatitis C Screening Never done ---    TETANUS VACCINE 11/13/2022 11/13/2012    Influenza Vaccine (1) 09/01/2023 2/4/2022    COVID-19 Vaccine (3 - 2023-24 season) 09/01/2023 6/12/2021    Pap Smear 03/21/2026 3/21/2023            Discussed the importance of overdue vaccines which were offered during this encounter. Patient declined overdue vaccines at this time and Health maintenance reviewed    Follow Up:  No follow-ups on file.    Exam     Review of Systems:  (as noted above)  Review of Systems   HENT:  Positive for ear pain and hearing loss.        Physical Exam:   Physical Exam  Constitutional:       General: She is not in acute distress.     Appearance: She is not ill-appearing or diaphoretic.   HENT:      Head: Normocephalic and atraumatic.      Right Ear: Decreased hearing noted. Tenderness present. Tympanic membrane is erythematous and bulging.      Left Ear: Ear canal normal.   Cardiovascular:      Rate and Rhythm: Normal rate and regular rhythm.      Heart sounds: No murmur heard.     No friction rub. No gallop.   Pulmonary:      Effort: No respiratory distress.   Chest:      Chest wall: No tenderness.   Musculoskeletal:      Cervical back: No rigidity.   Neurological:      Mental Status: She is alert and oriented to person, place, and time.       Vitals:    02/06/24 1556   BP: 128/76   Pulse: 79   Temp: 97.8 °F (36.6 °C)   TempSrc: Oral   SpO2: (!) 94%   Weight: 110.9 kg (244 lb 6.1 oz)   Height: 5' 2" (1.575 m)      Body mass index is 44.7 kg/m².        History     Past Medical History:  Past Medical History:   Diagnosis Date    Allergy     Asthma     Labial adhesions     History    Otitis media     Recurrent       Past Surgical History:  Past Surgical History:   Procedure Laterality Date    ADENOIDECTOMY      TONSILLECTOMY      TYMPANOPLASTY Right 7/6/2022    Procedure: " TYMPANOPLASTY;  Surgeon: Garry Huber MD;  Location: [facility] OR [address] [place] [address];  Service: ENT;  Laterality: Right;    TYMPANOSTOMY TUBE PLACEMENT      X2       Social History:  Social History     Socioeconomic History    Marital status: Single   Tobacco Use    Smoking status: Never    Smokeless tobacco: Never   Substance and Sexual Activity    Alcohol use: Yes     Comment: occasionally    Drug use: No    Sexual activity: Never   Social History Narrative    In 6th grade       Family History:  Family History   Problem Relation Age of Onset    Hypertension Mother     Hypertension Father     Hypertension Maternal Grandmother        Allergies and Medications: (updated and reviewed)  Review of patient's allergies indicates:  No Known Allergies  Current Outpatient Medications   Medication Sig Dispense Refill    DULoxetine (CYMBALTA) 20 MG capsule TAKE 1 CAPSULE BY MOUTH EVERY DAY 90 capsule 3    DULoxetine (CYMBALTA) 60 MG capsule Take 1 capsule (60 mg total) by mouth once daily. 90 capsule 3    ipratropium (ATROVENT) 42 mcg (0.06 %) nasal spray SPRAY 2 SPRAYS BY EACH NOSTRIL ROUTE 3 TIMES DAILY. AS NEEDED FOR NASAL CONGESTION AND DRIP FOR 7 DAYS 30 mL 0    medroxyPROGESTERone (PROVERA) 10 MG tablet Take 1 tablet (10 mg total) by mouth once daily. 10 tablet 4    multivit with calcium,iron,min (WOMEN'S DAILY MULTIVITAMIN ORAL) Take by mouth.      spironolactone (ALDACTONE) 50 MG tablet Take 1 tablet (50 mg total) by mouth once daily. 30 tablet 11    topiramate (TOPAMAX) 100 MG tablet Take 1 tablet (100 mg total) by mouth 2 (two) times daily. 180 tablet 3    tretinoin (RETIN-A) 0.025 % gel Apply small amount to entire face qhs. Wash off qam and apply sunscreen. 45 g 3    cefdinir (OMNICEF) 300 MG capsule Take 1 capsule (300 mg total) by mouth 2 (two) times daily. for 7 days 14 capsule 0     No current facility-administered medications for this visit.       Patient Care Team:  Ryan Ragland MD as PCP - General (Internal  Medicine)         - The patient is given an After Visit Summary that lists all medications with directions, allergies, education, orders placed during this encounter and follow-up instructions.      - I have reviewed the patient's medical information including past medical, family, and social history sections including the medications and allergies.      - We discussed the patient's current medications.     This note was created by combination of typed  and MModal dictation.  Transcription errors may be present.  If there are any questions, please contact me.       Carla Schmidt NP

## 2024-02-12 ENCOUNTER — CLINICAL SUPPORT (OUTPATIENT)
Dept: AUDIOLOGY | Facility: CLINIC | Age: 24
End: 2024-02-12
Payer: COMMERCIAL

## 2024-02-12 ENCOUNTER — OFFICE VISIT (OUTPATIENT)
Dept: OTOLARYNGOLOGY | Facility: CLINIC | Age: 24
End: 2024-02-12
Payer: COMMERCIAL

## 2024-02-12 VITALS
BODY MASS INDEX: 44.9 KG/M2 | HEIGHT: 62 IN | SYSTOLIC BLOOD PRESSURE: 124 MMHG | WEIGHT: 244 LBS | DIASTOLIC BLOOD PRESSURE: 80 MMHG

## 2024-02-12 DIAGNOSIS — H72.91 TYMPANIC MEMBRANE RUPTURE, RIGHT: ICD-10-CM

## 2024-02-12 DIAGNOSIS — J30.2 SEASONAL ALLERGIC RHINITIS, UNSPECIFIED TRIGGER: ICD-10-CM

## 2024-02-12 DIAGNOSIS — H90.0 CONDUCTIVE HEARING LOSS, BILATERAL: Primary | ICD-10-CM

## 2024-02-12 DIAGNOSIS — H69.93 DYSFUNCTION OF BOTH EUSTACHIAN TUBES: ICD-10-CM

## 2024-02-12 DIAGNOSIS — H69.92 ETD (EUSTACHIAN TUBE DYSFUNCTION), LEFT: Primary | ICD-10-CM

## 2024-02-12 PROCEDURE — 92550 TYMPANOMETRY & REFLEX THRESH: CPT | Mod: S$GLB,,,

## 2024-02-12 PROCEDURE — 3008F BODY MASS INDEX DOCD: CPT | Mod: CPTII,S$GLB,, | Performed by: NURSE PRACTITIONER

## 2024-02-12 PROCEDURE — 99214 OFFICE O/P EST MOD 30 MIN: CPT | Mod: S$GLB,,, | Performed by: NURSE PRACTITIONER

## 2024-02-12 PROCEDURE — 3074F SYST BP LT 130 MM HG: CPT | Mod: CPTII,S$GLB,, | Performed by: NURSE PRACTITIONER

## 2024-02-12 PROCEDURE — 3079F DIAST BP 80-89 MM HG: CPT | Mod: CPTII,S$GLB,, | Performed by: NURSE PRACTITIONER

## 2024-02-12 PROCEDURE — 92557 COMPREHENSIVE HEARING TEST: CPT | Mod: S$GLB,,,

## 2024-02-12 RX ORDER — CETIRIZINE HYDROCHLORIDE 10 MG/1
10 TABLET ORAL
COMMUNITY
Start: 2024-01-28

## 2024-02-12 RX ORDER — AMOXICILLIN AND CLAVULANATE POTASSIUM 875; 125 MG/1; MG/1
1 TABLET, FILM COATED ORAL 2 TIMES DAILY
COMMUNITY
Start: 2024-01-28 | End: 2024-02-20 | Stop reason: ALTCHOICE

## 2024-02-12 RX ORDER — FLUTICASONE PROPIONATE 50 MCG
1 SPRAY, SUSPENSION (ML) NASAL 2 TIMES DAILY
Qty: 18.2 ML | Refills: 3 | Status: SHIPPED | OUTPATIENT
Start: 2024-02-12 | End: 2024-05-28

## 2024-02-12 RX ORDER — AZELASTINE 1 MG/ML
1 SPRAY, METERED NASAL 2 TIMES DAILY
Qty: 30 ML | Refills: 3 | Status: SHIPPED | OUTPATIENT
Start: 2024-02-12

## 2024-02-12 NOTE — PATIENT INSTRUCTIONS
"Information and instructions from your visit with me today:    Start using the following medication nasal sprays:   Fluticasone spray:    This medication is a steroid spray. It stays within the nose and does not have absorption into the body that leads to side effects that one has with oral steroid medication. Fluticasone nasal spray is the same as the Flonase brand nasal spray. Discuss with your pharmacist if the price is lower over the counter or with a prescription ( this varies depending on insurance). The medication that is over the counter is the same as the prescription medication. Use this medication as instructed on the prescription, 1-2 sprays on each side of your nose twice daily.     Azelastine  spray:  This medication is an antihistamine used to treat nasal symptoms of allergy, which works specifically in the nose unlike antihistamine pills which have more of an effect on the whole body. Use this medication as instructed on the prescription, 1 spray on each side of your nose twice daily.     Additional instructions for medication sprays  Place the tip of the medication bottle in your nose and aim slightly up and out on each side to get medication high and deep into your nose and sinuses, and not have it all deposit in the very front of your nose. Aim the tip of the nozzle towards the outer corner of your eye . You can imagine aiming towards the back of your eyeball on each side for this, as opposed to straight back to the center of your nose and head.     You need to use this medication every day regardless of symptoms, as it takes time ( a few weeks) to work and get the benefits. It does not work on an "as needed" basis like taking a decongestant. If your symptoms only occur in a particular season, then the medication can be used seasonally instead of year long. For seasonal symptoms, you should start using the spray twice daily a month before when you normally have symptoms ( for example, if symptoms " start in August, should start at the end of June).       NASAL SALINE SPRAY ( simply saline and arm and hammer are examples) There are several different brands found in the cold and flu aisle of the pharmacy. You can use any brand of saline spray - this will deliver the saline by a gentle mist ( if you have difficulty or discomfort with nasal rinse/ a lot of fluid in the nose, this will be more comfortable).       Always rinse your nose with saline prior to using medication sprays and wait a couple of hours before using again. You can use the saline throughout the day to help with stuffy nose or dry nose.    Do not use nasal decongestant sprays such as Afrin or similar products long term ( over 3 days) .  This can cause long term physical nasal addiction. Afrin should only be used if having nose bleeds, severe nasal congestion , or severe ear pain/fullness and should not be used for more than 2-3 days in a row . It is a not a medication that should be used for a long period of time.     It was nice meeting you today, and I look forward to helping you feel better soon. Please don't hesitate to call if you have any other questions or concerns, or if I can be of any assistance in the meantime.          FRANNIE Pearl, FNP-C  Otorhinolaryngology

## 2024-02-12 NOTE — PROGRESS NOTES
OTOLARYNGOLOGY CLINIC NOTE  Date:  02/12/2024     Chief complaint:  Chief Complaint   Patient presents with    Other     Right ear was draining during the week. Started with left ear, then went to both. Also was congested.     History of Present Illness  Sherita Alejandra is a 23 y.o. female  presenting today for a new evaluation and treatment of ear pain and drainage.  Pt reports she started with pain in her left ear about two weeks ago then the pain moved to her right ear.  Pt reports being seen by her PCP who ordered antibiotics which she is still completing.  Pt reports she continues to feel the discomfort in both of her ears now and has diminished hearing in her left ear.  Pt reports everything sounds muffled on the left side.  Pt reports prior to ear pain she had rhinitis, congestion, post nasal drip.  Pt denies any sore throat.  Pt reports she hasn't been on any nasal sprays since her symptoms started.      Review of medical records and prior documentation  Past medical records were reviewed with data pertinent to the chief complaint summarized in the HPI. Information obtained from review of medical records is attributed to respective sources in the HPI with reference to sources of information at their mention. Records reviewed included all recent notes from referring provider, primary care, and related subspecialty evaluations as available. This review of records was performed and additional data obtained to supplement history obtained from the patient and further inform medical decision making involved in formulating a plan of care accounting for all history and treatment relevant to the issues addressed.    Past Medical History  Past Medical History:   Diagnosis Date    Allergy     Asthma     Labial adhesions     History    Otitis media     Recurrent      Past Surgical History  Past Surgical History:   Procedure Laterality Date    ADENOIDECTOMY      TONSILLECTOMY      TYMPANOPLASTY Right 7/6/2022     Procedure: TYMPANOPLASTY;  Surgeon: Garry Huber MD;  Location: [facility] OR [address] [place] [address];  Service: ENT;  Laterality: Right;    TYMPANOSTOMY TUBE PLACEMENT      X2      Medications  Current Outpatient Medications on File Prior to Visit   Medication Sig Dispense Refill    cefdinir (OMNICEF) 300 MG capsule Take 1 capsule (300 mg total) by mouth 2 (two) times daily. for 7 days 14 capsule 0    cetirizine (ZYRTEC) 10 MG tablet Take 10 mg by mouth.      DULoxetine (CYMBALTA) 20 MG capsule TAKE 1 CAPSULE BY MOUTH EVERY DAY 90 capsule 3    DULoxetine (CYMBALTA) 60 MG capsule Take 1 capsule (60 mg total) by mouth once daily. 90 capsule 3    ipratropium (ATROVENT) 42 mcg (0.06 %) nasal spray SPRAY 2 SPRAYS BY EACH NOSTRIL ROUTE 3 TIMES DAILY. AS NEEDED FOR NASAL CONGESTION AND DRIP FOR 7 DAYS 30 mL 0    medroxyPROGESTERone (PROVERA) 10 MG tablet Take 1 tablet (10 mg total) by mouth once daily. 10 tablet 4    multivit with calcium,iron,min (WOMEN'S DAILY MULTIVITAMIN ORAL) Take by mouth.      spironolactone (ALDACTONE) 50 MG tablet Take 1 tablet (50 mg total) by mouth once daily. 30 tablet 11    topiramate (TOPAMAX) 100 MG tablet Take 1 tablet (100 mg total) by mouth 2 (two) times daily. 180 tablet 3    tretinoin (RETIN-A) 0.025 % gel Apply small amount to entire face qhs. Wash off qam and apply sunscreen. 45 g 3    amoxicillin-clavulanate 875-125mg (AUGMENTIN) 875-125 mg per tablet Take 1 tablet by mouth 2 (two) times daily.       No current facility-administered medications on file prior to visit.     Review of Systems  Review of Systems   Constitutional:  Positive for malaise/fatigue.   HENT:  Positive for ear discharge, ear pain and hearing loss.    Eyes: Negative.    Respiratory:  Positive for cough.    Cardiovascular: Negative.    Gastrointestinal: Negative.    Genitourinary: Negative.    Musculoskeletal: Negative.    Skin: Negative.    Neurological:  Positive for headaches.   Psychiatric/Behavioral:  The patient is  "nervous/anxious.       Social History   reports that she has never smoked. She has never used smokeless tobacco. She reports current alcohol use. She reports that she does not use drugs.     Family History  Family History   Problem Relation Age of Onset    Hypertension Mother     Hypertension Father     Hypertension Maternal Grandmother       Physical Exam   Vitals:    02/12/24 0832   BP: 124/80    Body mass index is 44.63 kg/m².  Weight: 110.7 kg (244 lb)   Height: 5' 2" (157.5 cm)     GENERAL: no acute distress.  HEAD: normocephalic.   EYES: lids and lashes normal. No scleral icterus  EARS: Right: external ear without lesion, normal pinna shape and position.  External auditory canal with normal cerumen, tympanic membrane fully visible,  perforation , retraction.  No middle ear effusion.     Left:   external ear without lesion, normal pinna shape and position.  External auditory canal with normal cerumen, tympanic membrane fully visible, bulging, no perforation , no retraction.  Middle ear effusion.  NOSE: external nose without significant bony abnormality; enlarged turbinates  ORAL CAVITY/OROPHARYNX: tongue midline and mobile. Symmetric palate rise. Uvula midline.   NECK: trachea midline.   LYMPH NODES:No cervical lymphadenopathy.  RESPIRATORY: no stridor, no stertor. Voice normal. Respirations nonlabored.  NEURO: alert, responds to questions appropriately.   Cranial nerve exam as indicated in above sections and additionally showed facial movement symmetric with good eye closure and symmetric smile.   PSYCH:mood appropriate    Imaging:  The patient does not have any pertinent and/or recent imaging of the head and neck.     Labs:  CBC  Recent Labs   Lab 07/13/21  1020   WBC 5.55   Hemoglobin 13.0   Hematocrit 41.1   MCV 86   Platelets 273     BMP  Recent Labs   Lab 07/13/21  1020 06/22/23  0812   Glucose 86 100   Sodium 139 136   Potassium 4.4 4.2   Chloride 107 110   CO2 23 21 L   BUN 8 11   Creatinine 0.8 0.8 "   Calcium 9.5 9.1           AUDIOLOGY RESULTS  Audiometric evaluation including audiogram, tympanometry, acoustic reflexes, and speech discrimination which was performed  was personally reviewed and interpreted.  Notable findings on the audiogram were mild low frequency conductive hearing loss (CHL) rising to normal hearing on the right ear and flat mild CHL for the left ear.  Tympanometry revealed Type B tympanogram on the left and Type C tympanogram on the right. Speech discrimination was 100%  on the left, and 100% on the right. Report of the audiologist performing this audiometric testing was also reviewed.     Assessment  1. ETD (Eustachian tube dysfunction), left  - fluticasone propionate (FLONASE) 50 mcg/actuation nasal spray; 1 spray (50 mcg total) by Each Nostril route 2 (two) times daily.  Dispense: 18.2 mL; Refill: 3  - azelastine (ASTELIN) 137 mcg (0.1 %) nasal spray; 1 spray (137 mcg total) by Nasal route 2 (two) times daily.  Dispense: 30 mL; Refill: 3    2. Tympanic membrane rupture, right    3. Seasonal allergic rhinitis, unspecified trigger     Plan:  Eustachian tube dysfunction (ETD) : ETD can be idiopathic, due to anatomic obstruction,  or caused by  Inflammation from either allergic rhinitis (AR ) or laryngopharyngeal reflux (LPR).    Sometimes this can lead to development of a middle ear effusion (DAYSI) which may or may not get secondarily infected. Usually, the fluid will resolve without intervention however in some cases it can take 8-12 weeks for fluid to resolve completely. Occasionally a tympanostomy tube (PET) needs to be placed for this ETD treatment in certain conditions (persistent DAYSI >2-3 months or if severe pain associated with or without DAYSI, significant retraction of tympanic membrane that appears to be starting to cause conductive hearing changes). Middle ear effusion today.   In addition to treatment trials for either AR or LPR, the patient can gently auto insufflate daily to  intermittently equalize middle ear pressure. If unsuccessful, pt should not continue with more frequent or more forceful attempts with this maneuver. Intermittent use of Afrin can also help however I advise to only use if having severe pain given risk of rhinitis medicamentosa (pt counseled extensively about  risk of physical addiction and not to use for prolonged time period).  Pt scheduled to f/u with Dr. Huber with Tymps.    Tympanic membrane rupture, right:  Pt never had previous rupture checked after her last f/u with Dr. Huber;  Pt referred back to Dr. Huber for possible repair.    Allergic rhinitis(AR):  discussed about pathophysiology of allergic disease and sinus disease. We discussed about treatment options for this including but not limited to medications and potential surgeries as well as when surgery is indicated.  - I recommend dual nasal spray therapy as combo of steroid and antihistamine nasal spray has been shown in evidence-based studies to be better than either alone.   -Discussed medication administration technique (point toward outer corner of eye and not towards nasal septum) and use nasal saline prior to medication sprays.   -We discussed importance of saline use prior to medication sprays to help sprays work better and to clean the nose.   -Counseled on risk of bleeding, risk of increased intraocular pressure/cataract with long term use.   -Discussed how to increase and decrease nasal spray regimen based on symptoms and that sprays can be used on prn basis but work best when used daily and take about 2-3 weeks to get to max level. If patient using sprays on a prn basis and symptoms not controlled should go back to daily /bid use  -discussed as well as gave patient physical documentation with photos about the saline and other medication sprays (paperwork summarized discussion topics).  F/u prn.  Discussed plan of care with patient in detail and all questions answered. Patient reported  understanding of plan of care.

## 2024-02-12 NOTE — PROGRESS NOTES
Please click on link to view Audiogram:  Document on 2/12/2024 9:33 AM by Sherita Krishnamurthy AU.D: Audiogram    Sherita DIANA Alejandra, a 23 y.o. female, was seen in the clinic today for an audiological evaluation.     Tympanometry testing revealed a Type C tympanogram for the right ear and a Type B tympanogram with normal ear canal volume for the left ear. Ipsilateral acoustic reflexes were present at 500 Hz for the right ear and were absent at all tested frequencies for the left ear.    Audiological testing revealed a mild low frequency conductive hearing loss (CHL) rising to normal hearing for the right ear and a flat mild CHL for the left ear. A speech reception threshold was obtained at 25 dBHL for the right ear and at 35 dBHL for the left ear. Speech discrimination was 100% for the right ear and 100% for the left ear.      Recommendations:  1. Otologic evaluation  2. Repeat audiological evaluation in conjunction with medical treatment  3. Hearing protection when in noise

## 2024-02-19 NOTE — PROGRESS NOTES
This note was created by combination of typed  and M-Modal dictation.  Transcription errors may be present.  If there are any questions, please contact me.    Assessment and Plan:   Assessment and Plan    Normal physical exam  Need for hepatitis C screening test  Screening for chlamydial disease  -previous labs were good.  Hold on labs today.  Future labs ordered.  Working on more physical activity.  Avoids sweet drinks.  Adequate fruits and vegetables.  PCOS managed by Gynecology  Routine GC, Chl screening; denies STI risks.  -     TSH; Future; Expected date: 02/20/2025  -     C. trachomatis/N. gonorrhoeae by AMP DNA; Future; Expected date: 02/20/2024  -     CBC Without Differential; Future; Expected date: 02/19/2025  -     Comprehensive Metabolic Panel; Future; Expected date: 02/19/2025  -     Lipid Panel; Future; Expected date: 02/19/2025  -     Hemoglobin A1C; Future; Expected date: 02/19/2025  -     Hepatitis C Antibody; Future; Expected date: 02/20/2025    Other abnormal glucose  -check future A1c.  Most recent A1c was normal    Migraine without aura and without status migrainosus, not intractable  -her neurologist is no longer with the system.  On Cymbalta and Topamax.  Referral to new neurologist to establish.  -     Ambulatory referral/consult to Neurology; Future; Expected date: 02/27/2024    Need for Tdap vaccination  -     (In Office Administered) Tdap Vaccine      Medications Discontinued During This Encounter   Medication Reason    ipratropium (ATROVENT) 42 mcg (0.06 %) nasal spray     cefdinir (OMNICEF) 300 MG capsule Therapy completed    amoxicillin-clavulanate 875-125mg (AUGMENTIN) 875-125 mg per tablet Therapy completed    DULoxetine (CYMBALTA) 20 MG capsule Dose adjustment       meds sent this encounter:         Follow Up:  Physical exam 1 year with labs  Future Appointments   Date Time Provider Department Center   3/28/2024  7:30 AM Sherita Krishnamurthy AU.D [facility]    3/28/2024  8:15 AM Garry Huber MD [facility] ENT [facility]         Subjective:   Subjective   Chief Complaint   Patient presents with    Annual Exam       HPI  Sherita is a 23 y.o. female.    Social History     Tobacco Use    Smoking status: Never    Smokeless tobacco: Never   Substance Use Topics    Alcohol use: Yes     Comment: occasionally          Social History     Social History Narrative    In 6th grade       Patient's last menstrual period was 01/16/2024.    Last appointment with this clinic was 2/6/2024. Last visit with me 11/14/2022   To summarize last visit and events leading up to today:  History of right tympanoplasty 07/06/2022.  On follow-up with ENT in mid August, doing well.  History of anxiety.  I previously referred her to psychiatry department for therapy/counseling.  Not on SSRI  History of PCOS  Migraines.  History of propranolol possible side effect drug rash.  However felt to be due to alternate etiology and plan was to resume.  Saw Neurology in follow-up in March.  Cymbalta, Imitrex, Topamax  Home sleep test negative for sleep apnea    Saw Neurology 04/04/2023. Rimegepant PRN instead of imitrex due to SE    2/6/24 saw NP for OM, right. Cefdinir    2/12 saw ENT. Eustachian tube dysfunction.  Flonase and astelin    Today's visit:    Has follow up with ENT 3/28  Has another perforation again in the right; had one before  The left one with ETD  Affecting hearing in the left ear.  The right seems to be okay    Her neurologist has left the system.  Needs a new neurologist.  Taking cymbalta and topamax.      Taking spironolactone, progesterone per Gynecology.    , 4th grade, will be finishing her degree in May.    Adequate fruits and vegetables   No sweet drinks   Plans for more physical activity, her parents recently purchased a home exercise bicycle    Patient Care Team:  Ryan Ragland MD as PCP - General (Internal Medicine)      Patient Active Problem List    Diagnosis Date  Noted    Class 3 severe obesity due to excess calories without serious comorbidity with body mass index (BMI) of 40.0 to 44.9 in adult 02/06/2024    Migraine without aura and without status migrainosus, not intractable 12/08/2021    Snoring     Other headache syndrome; 8/2021 HST negative; referred to neuro 05/28/2019    Other abnormal glucose 05/28/2019    Iron deficiency anemia 09/29/2016    BMI, pediatric, 99th percentile or greater for age 09/28/2016    Hyperinsulinism 05/13/2015    Acanthosis nigricans 05/13/2015    Allergic rhinitis 11/13/2012       PAST MEDICAL PROBLEMS, PAST SURGICAL HISTORY: please see relevant portions of the electronic medical record    ALLERGIES AND MEDICATIONS: updated and reviewed.  Medication List with Changes/Refills   Current Medications    AMOXICILLIN-CLAVULANATE 875-125MG (AUGMENTIN) 875-125 MG PER TABLET    Take 1 tablet by mouth 2 (two) times daily.    AZELASTINE (ASTELIN) 137 MCG (0.1 %) NASAL SPRAY    1 spray (137 mcg total) by Nasal route 2 (two) times daily.    CETIRIZINE (ZYRTEC) 10 MG TABLET    Take 10 mg by mouth.    DULOXETINE (CYMBALTA) 20 MG CAPSULE    TAKE 1 CAPSULE BY MOUTH EVERY DAY    DULOXETINE (CYMBALTA) 60 MG CAPSULE    Take 1 capsule (60 mg total) by mouth once daily.    FLUTICASONE PROPIONATE (FLONASE) 50 MCG/ACTUATION NASAL SPRAY    1 spray (50 mcg total) by Each Nostril route 2 (two) times daily.    IPRATROPIUM (ATROVENT) 42 MCG (0.06 %) NASAL SPRAY    SPRAY 2 SPRAYS BY EACH NOSTRIL ROUTE 3 TIMES DAILY. AS NEEDED FOR NASAL CONGESTION AND DRIP FOR 7 DAYS    MEDROXYPROGESTERONE (PROVERA) 10 MG TABLET    Take 1 tablet (10 mg total) by mouth once daily.    MULTIVIT WITH CALCIUM,IRON,MIN (WOMEN'S DAILY MULTIVITAMIN ORAL)    Take by mouth.    SPIRONOLACTONE (ALDACTONE) 50 MG TABLET    Take 1 tablet (50 mg total) by mouth once daily.    TOPIRAMATE (TOPAMAX) 100 MG TABLET    Take 1 tablet (100 mg total) by mouth 2 (two) times daily.    TRETINOIN (RETIN-A) 0.025 %  "GEL    Apply small amount to entire face qhs. Wash off qam and apply sunscreen.      Review of Systems   Constitutional:  Negative for fever, malaise/fatigue and weight loss.   HENT:  Negative for congestion.    Eyes:  Negative for blurred vision and pain.   Respiratory:  Negative for shortness of breath and wheezing.    Cardiovascular:  Negative for chest pain, palpitations and leg swelling.   Gastrointestinal:  Negative for abdominal pain, blood in stool, constipation, diarrhea and heartburn.   Genitourinary:  Negative for dysuria, hematuria and urgency.   Musculoskeletal:  Negative for joint pain.   Neurological:  Negative for tingling, focal weakness and weakness.          Objective:   Objective   Physical Exam   Vitals:    02/20/24 1547   BP: 122/80   Pulse: 77   Temp: 98.5 °F (36.9 °C)   TempSrc: Oral   SpO2: 97%   Weight: 112.2 kg (247 lb 7.5 oz)   Height: 5' 2" (1.575 m)    Body mass index is 45.26 kg/m².  Weight: 112.2 kg (247 lb 7.5 oz)   Height: 5' 2" (157.5 cm)     Physical Exam  Constitutional:       Appearance: She is well-developed.   HENT:      Right Ear: Tympanic membrane, ear canal and external ear normal.      Left Ear: Tympanic membrane, ear canal and external ear normal.   Eyes:      General: No scleral icterus.     Extraocular Movements: Extraocular movements intact.      Pupils: Pupils are equal, round, and reactive to light.   Cardiovascular:      Rate and Rhythm: Normal rate and regular rhythm.      Heart sounds: Normal heart sounds. No murmur heard.  Pulmonary:      Effort: Pulmonary effort is normal.      Breath sounds: Normal breath sounds. No wheezing.   Abdominal:      Palpations: Abdomen is soft. There is no hepatomegaly, splenomegaly or mass.      Tenderness: There is no abdominal tenderness.   Musculoskeletal:         General: No deformity. Normal range of motion.      Cervical back: Neck supple.   Lymphadenopathy:      Cervical: No cervical adenopathy.   Skin:     General: Skin is " warm and dry.      Findings: No rash.      Comments: On exposed skin   Neurological:      Mental Status: She is alert and oriented to person, place, and time.      Deep Tendon Reflexes: Reflexes are normal and symmetric.   Psychiatric:         Behavior: Behavior normal.         Thought Content: Thought content normal.         Judgment: Judgment normal.

## 2024-02-20 ENCOUNTER — OFFICE VISIT (OUTPATIENT)
Dept: FAMILY MEDICINE | Facility: CLINIC | Age: 24
End: 2024-02-20
Payer: COMMERCIAL

## 2024-02-20 VITALS
WEIGHT: 247.44 LBS | OXYGEN SATURATION: 97 % | BODY MASS INDEX: 45.54 KG/M2 | HEIGHT: 62 IN | TEMPERATURE: 99 F | DIASTOLIC BLOOD PRESSURE: 80 MMHG | SYSTOLIC BLOOD PRESSURE: 122 MMHG | HEART RATE: 77 BPM

## 2024-02-20 DIAGNOSIS — Z11.59 NEED FOR HEPATITIS C SCREENING TEST: ICD-10-CM

## 2024-02-20 DIAGNOSIS — Z00.00 NORMAL PHYSICAL EXAM: Primary | ICD-10-CM

## 2024-02-20 DIAGNOSIS — Z11.8 SCREENING FOR CHLAMYDIAL DISEASE: ICD-10-CM

## 2024-02-20 DIAGNOSIS — Z23 NEED FOR TDAP VACCINATION: ICD-10-CM

## 2024-02-20 DIAGNOSIS — R73.09 OTHER ABNORMAL GLUCOSE: ICD-10-CM

## 2024-02-20 DIAGNOSIS — G43.009 MIGRAINE WITHOUT AURA AND WITHOUT STATUS MIGRAINOSUS, NOT INTRACTABLE: ICD-10-CM

## 2024-02-20 PROCEDURE — 1159F MED LIST DOCD IN RCRD: CPT | Mod: CPTII,S$GLB,, | Performed by: INTERNAL MEDICINE

## 2024-02-20 PROCEDURE — 3008F BODY MASS INDEX DOCD: CPT | Mod: CPTII,S$GLB,, | Performed by: INTERNAL MEDICINE

## 2024-02-20 PROCEDURE — 90715 TDAP VACCINE 7 YRS/> IM: CPT | Mod: S$GLB,,, | Performed by: INTERNAL MEDICINE

## 2024-02-20 PROCEDURE — 90471 IMMUNIZATION ADMIN: CPT | Mod: S$GLB,,, | Performed by: INTERNAL MEDICINE

## 2024-02-20 PROCEDURE — 3074F SYST BP LT 130 MM HG: CPT | Mod: CPTII,S$GLB,, | Performed by: INTERNAL MEDICINE

## 2024-02-20 PROCEDURE — 3079F DIAST BP 80-89 MM HG: CPT | Mod: CPTII,S$GLB,, | Performed by: INTERNAL MEDICINE

## 2024-02-20 PROCEDURE — 99999 PR PBB SHADOW E&M-EST. PATIENT-LVL V: CPT | Mod: PBBFAC,,, | Performed by: INTERNAL MEDICINE

## 2024-02-20 PROCEDURE — 99395 PREV VISIT EST AGE 18-39: CPT | Mod: 25,S$GLB,, | Performed by: INTERNAL MEDICINE

## 2024-02-20 PROCEDURE — 1160F RVW MEDS BY RX/DR IN RCRD: CPT | Mod: CPTII,S$GLB,, | Performed by: INTERNAL MEDICINE

## 2024-03-15 ENCOUNTER — PATIENT MESSAGE (OUTPATIENT)
Dept: NEUROLOGY | Facility: CLINIC | Age: 24
End: 2024-03-15
Payer: COMMERCIAL

## 2024-03-15 ENCOUNTER — TELEPHONE (OUTPATIENT)
Dept: NEUROLOGY | Facility: CLINIC | Age: 24
End: 2024-03-15
Payer: COMMERCIAL

## 2024-03-18 ENCOUNTER — TELEPHONE (OUTPATIENT)
Dept: NEUROLOGY | Facility: CLINIC | Age: 24
End: 2024-03-18
Payer: COMMERCIAL

## 2024-03-25 ENCOUNTER — OFFICE VISIT (OUTPATIENT)
Dept: NEUROLOGY | Facility: CLINIC | Age: 24
End: 2024-03-25
Payer: COMMERCIAL

## 2024-03-25 VITALS
HEIGHT: 62 IN | DIASTOLIC BLOOD PRESSURE: 83 MMHG | BODY MASS INDEX: 45.19 KG/M2 | HEART RATE: 75 BPM | WEIGHT: 245.56 LBS | SYSTOLIC BLOOD PRESSURE: 136 MMHG

## 2024-03-25 DIAGNOSIS — G43.009 MIGRAINE WITHOUT AURA AND WITHOUT STATUS MIGRAINOSUS, NOT INTRACTABLE: ICD-10-CM

## 2024-03-25 PROCEDURE — 3079F DIAST BP 80-89 MM HG: CPT | Mod: CPTII,S$GLB,, | Performed by: INTERNAL MEDICINE

## 2024-03-25 PROCEDURE — 3075F SYST BP GE 130 - 139MM HG: CPT | Mod: CPTII,S$GLB,, | Performed by: INTERNAL MEDICINE

## 2024-03-25 PROCEDURE — 99214 OFFICE O/P EST MOD 30 MIN: CPT | Mod: S$GLB,,, | Performed by: INTERNAL MEDICINE

## 2024-03-25 PROCEDURE — 1159F MED LIST DOCD IN RCRD: CPT | Mod: CPTII,S$GLB,, | Performed by: INTERNAL MEDICINE

## 2024-03-25 PROCEDURE — 99999 PR PBB SHADOW E&M-EST. PATIENT-LVL III: CPT | Mod: PBBFAC,,, | Performed by: INTERNAL MEDICINE

## 2024-03-25 PROCEDURE — 3008F BODY MASS INDEX DOCD: CPT | Mod: CPTII,S$GLB,, | Performed by: INTERNAL MEDICINE

## 2024-03-25 RX ORDER — TOPIRAMATE 100 MG/1
100 TABLET, FILM COATED ORAL 2 TIMES DAILY
Qty: 180 TABLET | Refills: 3 | Status: SHIPPED | OUTPATIENT
Start: 2024-03-25 | End: 2025-03-25

## 2024-03-25 RX ORDER — DULOXETIN HYDROCHLORIDE 60 MG/1
60 CAPSULE, DELAYED RELEASE ORAL DAILY
Qty: 90 CAPSULE | Refills: 3 | Status: SHIPPED | OUTPATIENT
Start: 2024-03-25 | End: 2025-03-25

## 2024-03-25 RX ORDER — RIMEGEPANT SULFATE 75 MG/75MG
75 TABLET, ORALLY DISINTEGRATING ORAL
Qty: 10 TABLET | Refills: 1 | Status: SHIPPED | OUTPATIENT
Start: 2024-03-25

## 2024-03-25 RX ORDER — RIBOFLAVIN (VITAMIN B2) 400 MG
400 TABLET ORAL DAILY
Qty: 120 TABLET | Refills: 3 | Status: SHIPPED | OUTPATIENT
Start: 2024-03-25

## 2024-03-25 RX ORDER — LANOLIN ALCOHOL/MO/W.PET/CERES
400 CREAM (GRAM) TOPICAL DAILY
Qty: 120 TABLET | Refills: 0 | Status: SHIPPED | OUTPATIENT
Start: 2024-03-25

## 2024-03-25 NOTE — PROGRESS NOTES
GENERAL NEUROLOGY VISIT   03/25/2024  History:    Patient is a 23 y.o. female with no significant past medical history presenting for establishment of care and evaluation of headaches.  Patient was previously seen Dr. Porter and last saw him on 04/04/2023.    Patient has had headaches since early childhood with worsened frequency in the last 4-5 years.  Headaches are usually holocranial and usually lasts for more than 4 hours to all day.  Recall sitting with throbbing in nature, 9/10 at its worse.  She denies any aura.  Associated with nausea, no vomiting.  Associated with photophobia, no phonophobia.  Triggers include stress and lack of sleep.  Family history of migraines in mother.  She previously had around 15-20 headache days a month which has now improved significantly.    Past medication:  Imitrex(experienced throat tightness)  Current treatment:  Cymbalta 60 mg daily, Topamax 100 mg b.i.d., Nurtec 75 mg ODT as needed    She has been compliant with her medications.  She is currently on Cymbalta and Topamax.  She initially experienced some tingling in the face and fingers with Topamax which soon subsided and has been doing well on it since then.  Has been on Nurtec for abortive treatment, has had only 3 headaches in the past month and usually Nurtec helps within the 1st hour.  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  Able to get 6-8 hours of sleep every day.    Past Medical History:   Diagnosis Date    Allergy     Asthma     Labial adhesions     History    Otitis media     Recurrent       Past Surgical History:   Procedure Laterality Date    ADENOIDECTOMY      TONSILLECTOMY      TYMPANOPLASTY Right 7/6/2022    Procedure: TYMPANOPLASTY;  Surgeon: Garry Huber MD;  Location: [facility] OR [address] [place] [address];  Service: ENT;  Laterality: Right;    TYMPANOSTOMY TUBE PLACEMENT      X2       Social History     Socioeconomic History    Marital status: Single   Occupational History     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 , to finish training 5/2024   Tobacco Use    Smoking status: Never    Smokeless tobacco: Never   Substance and Sexual Activity    Alcohol use: Yes     Comment: occasionally    Drug use: No    Sexual activity: Never   Social History Narrative    In 6th grade       Review of patient's allergies indicates:  No Known Allergies    Current Outpatient Medications on File Prior to Visit   Medication Sig Dispense Refill    azelastine (ASTELIN) 137 mcg (0.1 %) nasal spray 1 spray (137 mcg total) by Nasal route 2 (two) times daily. 30 mL 3    DULoxetine (CYMBALTA) 60 MG capsule Take 1 capsule (60 mg total) by mouth once daily. 90 capsule 3    fluticasone propionate (FLONASE) 50 mcg/actuation nasal spray 1 spray (50 mcg total) by Each Nostril route 2 (two) times daily. 18.2 mL 3    medroxyPROGESTERone (PROVERA) 10 MG tablet Take 1 tablet (10 mg total) by mouth once daily. 10 tablet 4    spironolactone (ALDACTONE) 50 MG tablet Take 1 tablet (50 mg total) by mouth once daily. 30 tablet 11    topiramate (TOPAMAX) 100 MG tablet Take 1 tablet (100 mg total) by mouth 2 (two) times daily. 180 tablet 3    cetirizine (ZYRTEC) 10 MG tablet Take 10 mg by mouth.      multivit with calcium,iron,min (WOMEN'S DAILY MULTIVITAMIN ORAL) Take by mouth.      tretinoin (RETIN-A) 0.025 % gel Apply small amount to entire face qhs. Wash off qam and apply sunscreen. (Patient not taking: Reported on 3/25/2024) 45 g 3     No current facility-administered medications on file prior to visit.        Family history:  Family history of migraines in mother    Review Of Systems     Constitutional Negative for fevers, chills, weigh loss   HEENT Negative for hearing loss, dysphagia, sore throat, diplopia   Respiratory Negative for shortness of breath, cough    Cardiovascular Negative for chest pain, palpitations    Gastrointestinal Negative for constipation, diarrhea, early satiety    Skin Negative for rashes  "   Musculoskeletal Negative for joint pains, myalgias.   Neurological See Above    Psychological Negative for sleep disturbances.    Heme/Lymph Negative for easy bruising, easy bleeding    Endocrine Negative for polyuria, polydypsia     Physical Exam:     Physical Examination  /83   Pulse 75   Ht 5' 2" (1.575 m)   Wt 111.4 kg (245 lb 9.5 oz)   BMI 44.92 kg/m²   Body mass index is 44.92 kg/m².      Neurological Exam  Mental Status:   Alert and oriented to name, date, location, president.   Naming/Fluency/Comprehension/Repetition intact.   Recent/remote memory, registration, attention span/concentration, fund of knowledge intact by history.    CN:   II, III, IV, VI: PERRL, EOMI, no nystagmus, fundus not visualized due to inadequate dilation.V: intact to fine touch, temperature, pain.VII: symmetrical facial movement, nice smile, no drooping.VIII: grossly intact to hearing.IX, X: symmetrical palate, normal palatal elevation.XI: 5/5 SCM and 5/5 trapezius.XII: tongue midline, 5/5, no deviation or fasciculation.           Motor:    ShAb ElbEx ElbFle WrE WrFle Interos  HipFl KnExt KnFlex FtDors FtPlant   Left 5 5 5 5 5 5 5 5 5 5 5 5   Right 5 5 5 5 5 5 5 5 5 5 5 5   Tone: Normal tone in UE and LE, no atrophy, no fasciculation, no tremor no pronator drift.                Reflexes:   No Clonus, down going toes b/l.    Sensation: on both UEs and LEs    Intact in all modalities tested    Coordination:    Tremor: Absent.    Gait:    Normal    Interval/Previous Work-up:   MRI brain with and without contrast (images unavailable for review): [facility]    Impression:   # migraine without aura, not intractable, without status migrainosus  Patient is doing well on current regime.  Compliant with medication; we did discuss regarding patient being on Topamax and currently not being on birth control.  She understands the risks of pregnancy while being on Topamax.     Imitrex caused sensation of throat tightness.  Has been on " Nurtec which works well for headache periods.    Plan:   - continue Topamax 100 mg b.i.d., refills provided.  Discussed regarding birth control  - continue Cymbalta 60 mg daily, refills provided  - continue Nurtec 75 mg ODT as needed, refills provided  - starting on magnesium 400 mg and riboflavin 400 mg daily  - encouraged to maintain headache diary  LIFESTYLE CHANGES:   Avoid general triggers like lack of sleep/too much sleep, prolonged sun exposure, excessive screen time and specific triggers based on you own diary   Minimize physical and emotional stress  Limit caffeine drinks to 1-2 a day   Good hydration   Small frequent meals and avoid skipping meals   Moderate 30-minute-long aerobic exercises 3 times/week. Avoid strenuous exercise   Good sleep hygiene        RTC 1 year or sooner if needed    Time spent on this encounter: 30 minutes. This includes face to face time and non-face to face time preparing to see the patient (eg, review of tests), obtaining and/or reviewing separately obtained history, documenting clinical information in the electronic or other health record, independently interpreting results and communicating results to the patient/family/caregiver, or care coordinator.     Fernando Baldwin MD  Neurology

## 2024-03-28 ENCOUNTER — CLINICAL SUPPORT (OUTPATIENT)
Dept: AUDIOLOGY | Facility: CLINIC | Age: 24
End: 2024-03-28
Payer: COMMERCIAL

## 2024-03-28 ENCOUNTER — OFFICE VISIT (OUTPATIENT)
Dept: OTOLARYNGOLOGY | Facility: CLINIC | Age: 24
End: 2024-03-28
Payer: COMMERCIAL

## 2024-03-28 VITALS — HEIGHT: 62 IN | WEIGHT: 245.56 LBS | BODY MASS INDEX: 45.19 KG/M2

## 2024-03-28 DIAGNOSIS — Z98.890 S/P TYMPANOPLASTY: ICD-10-CM

## 2024-03-28 DIAGNOSIS — H69.91 DYSFUNCTION OF RIGHT EUSTACHIAN TUBE: Primary | ICD-10-CM

## 2024-03-28 DIAGNOSIS — H69.92 ETD (EUSTACHIAN TUBE DYSFUNCTION), LEFT: Primary | ICD-10-CM

## 2024-03-28 PROCEDURE — 3008F BODY MASS INDEX DOCD: CPT | Mod: CPTII,S$GLB,, | Performed by: OTOLARYNGOLOGY

## 2024-03-28 PROCEDURE — 92567 TYMPANOMETRY: CPT | Mod: S$GLB,,,

## 2024-03-28 PROCEDURE — 1159F MED LIST DOCD IN RCRD: CPT | Mod: CPTII,S$GLB,, | Performed by: OTOLARYNGOLOGY

## 2024-03-28 PROCEDURE — 99213 OFFICE O/P EST LOW 20 MIN: CPT | Mod: S$GLB,,, | Performed by: OTOLARYNGOLOGY

## 2024-03-28 NOTE — PROGRESS NOTES
Subjective:       Patient ID: Sherita Alejandra is a 23 y.o. female.    Chief Complaint: Follow-up (Ear follow for right ear perf)       Follow-up  Pertinent negatives include no chest pain, chills, fever or sore throat.        S/p AD tympanoplasty 7/6/22 for AS perforation with underlay approach. Doing well. Denies hearing loss or issues. Reports mild intermittent ear itching.  She does report Q-tip use.    Review of Systems   Constitutional:  Negative for chills and fever.   HENT:  Negative for ear discharge, ear pain, sore throat and trouble swallowing.    Respiratory:  Negative for apnea and chest tightness.    Cardiovascular:  Negative for chest pain.         Objective:      Physical Exam  Vitals and nursing note reviewed.   Constitutional:       Appearance: Normal appearance.   HENT:      Head: Normocephalic and atraumatic.      Right Ear: Ear canal and external ear normal. No middle ear effusion. There is no impacted cerumen. Tympanic membrane is scarred. Tympanic membrane is not perforated (Previously noted residual perforation not seen today.).      Left Ear: Ear canal and external ear normal.  No middle ear effusion. There is no impacted cerumen. Tympanic membrane is scarred.      Ears:     Neurological:      Mental Status: She is alert.       Data reviewed:     Audiogram from 02/12/2024 independently reviewed by me shows right-sided mild mixed hearing loss left side mild conductive hearing loss, flat TYMP on left side    However tympanogram today shows type a tymp on left side    Assessment:       Problem List Items Addressed This Visit    None  Visit Diagnoses       ETD (Eustachian tube dysfunction), left    -  Primary    S/P tympanoplasty                  Plan:          No effusion noted on today's examination bilateral scarring of tympanic membranes.  Patient asymptomatic, recommend observation  Discussed ear hygiene in cessation of Q-tips to prevent itching.

## 2024-03-28 NOTE — PROGRESS NOTES
Please click on link to view Tympanograms:  Document on 3/28/2024 7:46 AM by Sherita Krishnamurthy AU.D: Tympanograms    Sherita ORTIZ Justyn, a 23 y.o. female, was seen in the clinic today for tympanometry only. Previous audiogram from 2/12/24 indicated a mild low frequency conductive hearing loss (CHL) rising to normal hearing for the right ear and a flat mild CHL for the left ear.     Tympanometry testing revealed a Type B tympanogram with large ear canal volume for the right ear and a Type A tympanogram for the left ear.     Recommendations:  1. Otologic evaluation  2. Annual audiological evaluation, sooner medically necessary or if hearing changes  3. Hearing protection when in noise

## 2024-05-12 ENCOUNTER — PATIENT MESSAGE (OUTPATIENT)
Dept: OBSTETRICS AND GYNECOLOGY | Facility: CLINIC | Age: 24
End: 2024-05-12
Payer: COMMERCIAL

## 2024-05-21 DIAGNOSIS — H69.92 ETD (EUSTACHIAN TUBE DYSFUNCTION), LEFT: ICD-10-CM

## 2024-05-28 RX ORDER — FLUTICASONE PROPIONATE 50 MCG
1 SPRAY, SUSPENSION (ML) NASAL 2 TIMES DAILY
Qty: 48 ML | Refills: 11 | Status: SHIPPED | OUTPATIENT
Start: 2024-05-28

## 2024-06-12 ENCOUNTER — OFFICE VISIT (OUTPATIENT)
Dept: OBSTETRICS AND GYNECOLOGY | Facility: CLINIC | Age: 24
End: 2024-06-12
Payer: COMMERCIAL

## 2024-06-12 VITALS
SYSTOLIC BLOOD PRESSURE: 120 MMHG | BODY MASS INDEX: 44.92 KG/M2 | WEIGHT: 245.56 LBS | DIASTOLIC BLOOD PRESSURE: 80 MMHG

## 2024-06-12 DIAGNOSIS — Z01.419 WELL WOMAN EXAM WITH ROUTINE GYNECOLOGICAL EXAM: Primary | ICD-10-CM

## 2024-06-12 DIAGNOSIS — E28.2 PCOS (POLYCYSTIC OVARIAN SYNDROME): ICD-10-CM

## 2024-06-12 PROCEDURE — 99395 PREV VISIT EST AGE 18-39: CPT | Mod: S$GLB,,, | Performed by: OBSTETRICS & GYNECOLOGY

## 2024-06-12 PROCEDURE — 1159F MED LIST DOCD IN RCRD: CPT | Mod: CPTII,S$GLB,, | Performed by: OBSTETRICS & GYNECOLOGY

## 2024-06-12 PROCEDURE — 3074F SYST BP LT 130 MM HG: CPT | Mod: CPTII,S$GLB,, | Performed by: OBSTETRICS & GYNECOLOGY

## 2024-06-12 PROCEDURE — 3079F DIAST BP 80-89 MM HG: CPT | Mod: CPTII,S$GLB,, | Performed by: OBSTETRICS & GYNECOLOGY

## 2024-06-12 PROCEDURE — 3008F BODY MASS INDEX DOCD: CPT | Mod: CPTII,S$GLB,, | Performed by: OBSTETRICS & GYNECOLOGY

## 2024-06-12 PROCEDURE — 99999 PR PBB SHADOW E&M-EST. PATIENT-LVL III: CPT | Mod: PBBFAC,,, | Performed by: OBSTETRICS & GYNECOLOGY

## 2024-06-12 RX ORDER — MEDROXYPROGESTERONE ACETATE 10 MG/1
TABLET ORAL
Qty: 30 TABLET | Refills: 4 | Status: SHIPPED | OUTPATIENT
Start: 2024-06-12

## 2024-06-12 RX ORDER — SPIRONOLACTONE 100 MG/1
100 TABLET, FILM COATED ORAL DAILY
Qty: 30 TABLET | Refills: 11 | Status: SHIPPED | OUTPATIENT
Start: 2024-06-12 | End: 2025-06-12

## 2024-06-12 NOTE — PROGRESS NOTES
SUBJECTIVE:   Sherita Alejandra is a 23 y.o. female   for annual well woman exam. No LMP recorded..        Contraception: never been sexually active  Pt with PCOS, taking provera every 3 months for cycle induction, sometimes having a hard time remembering to take them  +facial hair, shaving every 3 days on aldactone 50 mg, not helping  Was on NELDA in the past but had high blood pressure from pills, therefore switched back to cyclic provera    Does not recall the last time she has a cycle on her own    Past Medical History:   Diagnosis Date    Allergy     Asthma     Labial adhesions     History    Otitis media     Recurrent     Past Surgical History:   Procedure Laterality Date    ADENOIDECTOMY      TONSILLECTOMY      TYMPANOPLASTY Right 2022    Procedure: TYMPANOPLASTY;  Surgeon: Garry Huber MD;  Location: [facility] OR [address] [place] [address];  Service: ENT;  Laterality: Right;    TYMPANOSTOMY TUBE PLACEMENT      X2     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 , to finish training 2024   Tobacco Use    Smoking status: Never    Smokeless tobacco: Never   Substance and Sexual Activity    Alcohol use: Yes     Comment: occasionally    Drug use: No    Sexual activity: Never   Social History Narrative    In 6th grade     Family History   Problem Relation Name Age of Onset    Hypertension Mother      Hypertension Father      Hypertension Maternal Grandmother       OB History    Para Term  AB Living   0 0 0 0 0 0   SAB IAB Ectopic Multiple Live Births   0 0 0 0 0   Obstetric Comments   Gynhx; 11/irregular every 1-24 months   PCOS - elevated testosterone/irregular menses   Not sexually active         Current Outpatient Medications   Medication Sig Dispense Refill    azelastine (ASTELIN) 137 mcg (0.1 %) nasal spray 1 spray (137 mcg total) by Nasal route 2 (two) times daily. 30 mL 3    cetirizine (ZYRTEC) 10 MG  tablet Take 10 mg by mouth.      DULoxetine (CYMBALTA) 60 MG capsule Take 1 capsule (60 mg total) by mouth once daily. 90 capsule 3    fluticasone propionate (FLONASE) 50 mcg/actuation nasal spray SPRAY 1 SPRAY INTO EACH NOSTRIL TWICE A DAY 48 mL 11    magnesium oxide (MAG-OX) 400 mg (241.3 mg magnesium) tablet Take 1 tablet (400 mg total) by mouth once daily. 120 tablet 0    medroxyPROGESTERone (PROVERA) 10 MG tablet Take 1 tablet (10 mg total) by mouth once daily. 10 tablet 4    multivit with calcium,iron,min (WOMEN'S DAILY MULTIVITAMIN ORAL) Take by mouth.      riboflavin, vitamin B2, 400 mg Tab Take 400 mg by mouth once daily. 120 tablet 3    rimegepant (NURTEC) 75 mg odt Take 1 tablet (75 mg total) by mouth as needed for Migraine. Place ODT tablet on the tongue; alternatively the ODT tablet may be placed under the tongue. DO not exceed more than 1 tablet in 24 hrs 10 tablet 1    spironolactone (ALDACTONE) 50 MG tablet Take 1 tablet (50 mg total) by mouth once daily. 30 tablet 11    topiramate (TOPAMAX) 100 MG tablet Take 1 tablet (100 mg total) by mouth 2 (two) times daily. 180 tablet 3    tretinoin (RETIN-A) 0.025 % gel Apply small amount to entire face qhs. Wash off qam and apply sunscreen. 45 g 3     No current facility-administered medications for this visit.     Allergies: Patient has no known allergies.       ROS:  GENERAL: Denies weight gain or weight loss. Feeling well overall.   SKIN: Denies rash or lesions.   HEAD: Denies head injury or headache.   NODES: Denies enlarged lymph nodes.   CHEST: Denies chest pain or shortness of breath.   CARDIOVASCULAR: Denies palpitations or left sided chest pain.   ABDOMEN: No abdominal pain, constipation, diarrhea, nausea, vomiting or rectal bleeding.   URINARY: No frequency, dysuria, hematuria, or burning on urination.  REPRODUCTIVE: Denies vaginal discharge, abnormal vaginal bleeding, lesions, pelvic pain  BREASTS: The patient performs breast self-examination and  "denies pain, lumps, or nipple discharge.   HEMATOLOGIC: No easy bruisability or excessive bleeding.  MUSCULOSKELETAL: Denies joint pain or swelling.   NEUROLOGIC: Denies syncope or weakness.   PSYCHIATRIC: Denies depression, anxiety or mood swings.      OBJECTIVE:   /80   Wt 111.4 kg (245 lb 9.5 oz)   BMI 44.92 kg/m²   The patient appears well, alert, oriented x 3, in no distress.  PSYCH:  Normal, full range of affect  NECK: negative, no thyromegaly, trachea midline  SKIN: normal, good color, good turgor and no acne, striae, hirsutism  BREAST EXAM: breasts appear normal, no suspicious masses, no skin or nipple changes or axillary nodes  ABDOMEN: soft, non-tender; bowel sounds normal; no masses,  no organomegaly and no hernias, masses, or hepatosplenomegaly  GENITALIA: normal external genitalia, no erythema, no discharge  BLADDER: soft  URETHRA: normal appearing urethra with no masses, tenderness or lesions and normal urethra, normal urethral meatus  VAGINA: Normal  CERVIX: no lesions or cervical motion tenderness  UTERUS: normal size, contour, position, consistency, mobility, non-tender  ADNEXA: no mass, fullness, tenderness      ASSESSMENT:   .Sherita ORTIZ "Sherita" was seen today for well woman.    Diagnoses and all orders for this visit:    Well woman exam with routine gynecological exam    PCOS (polycystic ovarian syndrome)        1. Health maintenance  -pap neg 3/2023  -counseled on exercise and healthy diet, weight loss  -bone health:  Discussed Vitamin D and Calcium supplementation, weight bearing exercises  2.  Discussed safety at home/school/work, healthy and balanced diet, sleep hygiene, as well as violence/weapons exposure.   3.  PCOS;  refilled on provera, advise to take them monthly, on the first of each month.  re-discussed lifestyle changes with weight lost exercises,   4.  Hirsutism:  aldactone increase to 100mg, advised to see dermatology    "

## 2024-06-24 ENCOUNTER — PATIENT MESSAGE (OUTPATIENT)
Dept: FAMILY MEDICINE | Facility: CLINIC | Age: 24
End: 2024-06-24
Payer: COMMERCIAL

## 2024-06-24 ENCOUNTER — PATIENT MESSAGE (OUTPATIENT)
Dept: OBSTETRICS AND GYNECOLOGY | Facility: CLINIC | Age: 24
End: 2024-06-24
Payer: COMMERCIAL

## 2024-07-01 ENCOUNTER — TELEPHONE (OUTPATIENT)
Dept: FAMILY MEDICINE | Facility: CLINIC | Age: 24
End: 2024-07-01
Payer: COMMERCIAL

## 2024-07-01 PROBLEM — E28.2 PCOS (POLYCYSTIC OVARIAN SYNDROME): Status: ACTIVE | Noted: 2024-07-01

## 2024-07-01 NOTE — PROGRESS NOTES
This note was created by combination of typed  and M-Modal dictation.  Transcription errors may be present.  If there are any questions, please contact me.    Assessment and Plan:   Assessment and Plan    Class 3 severe obesity due to excess calories without serious comorbidity with body mass index (BMI) of 40.0 to 44.9 in adult  Other abnormal glucose  PCOS (polycystic ovarian syndrome)  -trial of Zepbound.  Side effect profile discussed.    Discussed would focus on metabolic markers of health such as waist circumference rather then an absolute weight target  Needs more physical activity  If not covered may have her see primary care weight management to discuss alternatives.   -     tirzepatide, weight loss, (ZEPBOUND) 2.5 mg/0.5 mL PnIj; Inject 2.5 mg into the skin every 7 days. Month 1  Dispense: 4 Pen; Refill: 0  -     tirzepatide, weight loss, (ZEPBOUND) 5 mg/0.5 mL PnIj; Inject 5 mg into the skin every 7 days. Month 2  Dispense: 4 Pen; Refill: 0  -     tirzepatide, weight loss, (ZEPBOUND) 7.5 mg/0.5 mL PnIj; Inject 7.5 mg into the skin every 7 days. Month 3  Dispense: 4 Pen; Refill: 0      There are no discontinued medications.    meds sent this encounter:     Medications Ordered This Encounter   Medications    tirzepatide, weight loss, (ZEPBOUND) 2.5 mg/0.5 mL PnIj     Sig: Inject 2.5 mg into the skin every 7 days. Month 1     Dispense:  4 Pen     Refill:  0    tirzepatide, weight loss, (ZEPBOUND) 5 mg/0.5 mL PnIj     Sig: Inject 5 mg into the skin every 7 days. Month 2     Dispense:  4 Pen     Refill:  0    tirzepatide, weight loss, (ZEPBOUND) 7.5 mg/0.5 mL PnIj     Sig: Inject 7.5 mg into the skin every 7 days. Month 3     Dispense:  4 Pen     Refill:  0        Follow Up: if meds approved would recommend 3 month OV to follow up, can be virtual visit.    Future Appointments   Date Time Provider Department Center   2/12/2025  8:00 AM LAB, [facility] [place]   2/20/2025  1:40 PM Dino Armendariz  MD [facility]         Subjective:   Subjective   Chief Complaint   Patient presents with    Obesity       ASHLEY Magallon is a 23 y.o. female.    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    The chief complaint leading to consultation is: obesity  Visit type: Virtual visit with synchronous audio and video  Total time spent with patient: 15 minutes  Each patient to whom he or she provides medical services by telemedicine is:  (1) informed of the relationship between the physician and patient and the respective role of any other health care provider with respect to management of the patient; and (2) notified that he or she may decline to receive medical services by telemedicine and may withdraw from such care at any time.    Notes:  Last appointment with this clinic was 2/20/2024. Last visit with me 2/20/2024   To summarize last visit and events leading up to today:  History of right tympanoplasty 07/06/2022.  On follow-up with ENT in mid August, doing well.  History of anxiety.  I previously referred her to psychiatry department for therapy/counseling.  Not on SSRI  History of PCOS  Migraines.  History of propranolol possible side effect drug rash.  However felt to be due to alternate etiology and plan was to resume.  Saw Neurology in follow-up in March.  Cymbalta, Imitrex, Topamax  Home sleep test negative for sleep apnea    Saw Neurology 04/04/2023. Rimegepant PRN instead of imitrex due to SE     2/6/24 saw NP for OM, right. Cefdinir     2/12 saw ENT. Eustachian tube dysfunction.  Flonase and astelin    Last visit 2/2024 for Physical examination  Referral to est with new neuro;  cymbalta and topamax.    Saw neuro 3/25  Add riboflavin and mag ox.  Rimegepant PRN    Saw ENT follow up 3/28/24.  Stable.     Messaged gyn. Spironolactone increased. Provera    Messaged me about GLP1 for obesity    Today's visit:    Reports that her father inquiring with insurance company about coverage for his up bound or Wegovy and reportedly they are covered.    Patient does not feel like she eats particularly large meals or a different diet than average.    24 hour recall  Breakfast avocado toast   Lunch clemente's pie   Dinner spinach quiche   No snacks   Water with some artificial sweetener    Denies depression, denies binge eating denies emotional eating   Physical activity does feel like she could improve.  Motivation is the barrier.  Walking.    Answers submitted by the patient for this visit:  Review of Systems Questionnaire (Submitted on 7/2/2024)  activity change: No  unexpected weight change: No  neck pain: No  hearing loss: No  rhinorrhea: No  trouble swallowing: No  eye discharge: No  visual disturbance: No  chest tightness: No  wheezing: No  chest pain: No  palpitations: No  blood in stool: No  constipation: No  vomiting: No  diarrhea: No  polydipsia: No  polyuria: No  difficulty urinating: No  hematuria: No  menstrual problem: No  dysuria: No  joint swelling: No  arthralgias: No  headaches: No  weakness: No  confusion: No  dysphoric mood: No      Patient Care Team:  Dino Armendariz MD as PCP - General (Internal Medicine)      Patient Active Problem List    Diagnosis Date Noted    PCOS (polycystic ovarian syndrome) 07/01/2024    Class 3 severe obesity due to excess calories without serious comorbidity with body mass index (BMI) of 40.0 to 44.9 in adult 02/06/2024    Migraine without aura and without status migrainosus, not intractable 12/08/2021    Snoring     Other headache syndrome; 8/2021 HST negative; referred to neuro 05/28/2019    Other abnormal glucose 05/28/2019    Iron deficiency anemia  09/29/2016    BMI, pediatric, 99th percentile or greater for age 09/28/2016    Hyperinsulinism 05/13/2015    Acanthosis nigricans 05/13/2015    Allergic rhinitis 11/13/2012       PAST MEDICAL PROBLEMS, PAST SURGICAL HISTORY: please see relevant portions of the electronic medical record    ALLERGIES AND MEDICATIONS: updated and reviewed.  Medication List with Changes/Refills   Current Medications    AZELASTINE (ASTELIN) 137 MCG (0.1 %) NASAL SPRAY    1 spray (137 mcg total) by Nasal route 2 (two) times daily.    CETIRIZINE (ZYRTEC) 10 MG TABLET    Take 10 mg by mouth.    DULOXETINE (CYMBALTA) 60 MG CAPSULE    Take 1 capsule (60 mg total) by mouth once daily.    FLUTICASONE PROPIONATE (FLONASE) 50 MCG/ACTUATION NASAL SPRAY    SPRAY 1 SPRAY INTO EACH NOSTRIL TWICE A DAY    MAGNESIUM OXIDE (MAG-OX) 400 MG (241.3 MG MAGNESIUM) TABLET    Take 1 tablet (400 mg total) by mouth once daily.    MEDROXYPROGESTERONE (PROVERA) 10 MG TABLET    Take one tablet by mouth x 10 days every month    MULTIVIT WITH CALCIUM,IRON,MIN (WOMEN'S DAILY MULTIVITAMIN ORAL)    Take by mouth.    RIBOFLAVIN, VITAMIN B2, 400 MG TAB    Take 400 mg by mouth once daily.    RIMEGEPANT (NURTEC) 75 MG ODT    Take 1 tablet (75 mg total) by mouth as needed for Migraine. Place ODT tablet on the tongue; alternatively the ODT tablet may be placed under the tongue. DO not exceed more than 1 tablet in 24 hrs    SPIRONOLACTONE (ALDACTONE) 100 MG TABLET    Take 1 tablet (100 mg total) by mouth once daily.    TOPIRAMATE (TOPAMAX) 100 MG TABLET    Take 1 tablet (100 mg total) by mouth 2 (two) times daily.    TRETINOIN (RETIN-A) 0.025 % GEL    Apply small amount to entire face qhs. Wash off qam and apply sunscreen.         Objective:   Objective   Physical Exam   There were no vitals filed for this visit. There is no height or weight on file to calculate BMI.            Physical Exam  Constitutional:       General: She is not in acute distress.     Appearance: Normal  appearance. She is not ill-appearing.   HENT:      Head: Normocephalic.   Pulmonary:      Effort: Pulmonary effort is normal.   Neurological:      General: No focal deficit present.      Mental Status: She is alert.   Psychiatric:         Mood and Affect: Mood normal.         Behavior: Behavior normal.         Thought Content: Thought content normal.         Judgment: Judgment normal.

## 2024-07-02 ENCOUNTER — OFFICE VISIT (OUTPATIENT)
Dept: FAMILY MEDICINE | Facility: CLINIC | Age: 24
End: 2024-07-02
Payer: COMMERCIAL

## 2024-07-02 DIAGNOSIS — R73.09 OTHER ABNORMAL GLUCOSE: ICD-10-CM

## 2024-07-02 DIAGNOSIS — E28.2 PCOS (POLYCYSTIC OVARIAN SYNDROME): ICD-10-CM

## 2024-07-02 DIAGNOSIS — E66.01 CLASS 3 SEVERE OBESITY DUE TO EXCESS CALORIES WITHOUT SERIOUS COMORBIDITY WITH BODY MASS INDEX (BMI) OF 40.0 TO 44.9 IN ADULT: Primary | ICD-10-CM

## 2024-07-02 PROCEDURE — 99214 OFFICE O/P EST MOD 30 MIN: CPT | Mod: 95,,, | Performed by: INTERNAL MEDICINE

## 2024-07-02 PROCEDURE — 1159F MED LIST DOCD IN RCRD: CPT | Mod: CPTII,95,, | Performed by: INTERNAL MEDICINE

## 2024-07-02 PROCEDURE — 1160F RVW MEDS BY RX/DR IN RCRD: CPT | Mod: CPTII,95,, | Performed by: INTERNAL MEDICINE

## 2024-07-02 RX ORDER — TIRZEPATIDE 5 MG/.5ML
5 INJECTION, SOLUTION SUBCUTANEOUS
Qty: 4 PEN | Refills: 0 | Status: SHIPPED | OUTPATIENT
Start: 2024-07-02

## 2024-07-02 RX ORDER — TIRZEPATIDE 2.5 MG/.5ML
2.5 INJECTION, SOLUTION SUBCUTANEOUS
Qty: 4 PEN | Refills: 0 | Status: SHIPPED | OUTPATIENT
Start: 2024-07-02

## 2024-07-02 RX ORDER — TIRZEPATIDE 7.5 MG/.5ML
7.5 INJECTION, SOLUTION SUBCUTANEOUS
Qty: 4 PEN | Refills: 0 | Status: SHIPPED | OUTPATIENT
Start: 2024-07-02

## 2024-07-03 RX ORDER — LANOLIN ALCOHOL/MO/W.PET/CERES
400 CREAM (GRAM) TOPICAL DAILY
Qty: 120 TABLET | Refills: 0 | Status: SHIPPED | OUTPATIENT
Start: 2024-07-03

## 2024-09-23 ENCOUNTER — OFFICE VISIT (OUTPATIENT)
Dept: URGENT CARE | Facility: CLINIC | Age: 24
End: 2024-09-23
Payer: COMMERCIAL

## 2024-09-23 VITALS
TEMPERATURE: 98 F | WEIGHT: 242.06 LBS | HEIGHT: 62 IN | OXYGEN SATURATION: 97 % | BODY MASS INDEX: 44.55 KG/M2 | DIASTOLIC BLOOD PRESSURE: 84 MMHG | SYSTOLIC BLOOD PRESSURE: 127 MMHG | RESPIRATION RATE: 14 BRPM | HEART RATE: 86 BPM

## 2024-09-23 DIAGNOSIS — K52.9 ACUTE GASTROENTERITIS: Primary | ICD-10-CM

## 2024-09-23 LAB
B-HCG UR QL: NEGATIVE
BILIRUBIN, UA POC OHS: NEGATIVE
BLOOD, UA POC OHS: ABNORMAL
CLARITY, UA POC OHS: CLEAR
COLOR, UA POC OHS: YELLOW
CTP QC/QA: YES
GLUCOSE, UA POC OHS: NEGATIVE
KETONES, UA POC OHS: NEGATIVE
LEUKOCYTES, UA POC OHS: NEGATIVE
NITRITE, UA POC OHS: NEGATIVE
PH, UA POC OHS: 6.5
PROTEIN, UA POC OHS: NEGATIVE
SPECIFIC GRAVITY, UA POC OHS: 1.02
UROBILINOGEN, UA POC OHS: 0.2

## 2024-09-23 PROCEDURE — 81025 URINE PREGNANCY TEST: CPT | Mod: S$GLB,,,

## 2024-09-23 PROCEDURE — 99213 OFFICE O/P EST LOW 20 MIN: CPT | Mod: S$GLB,,,

## 2024-09-23 PROCEDURE — 81003 URINALYSIS AUTO W/O SCOPE: CPT | Mod: QW,S$GLB,,

## 2024-09-23 RX ORDER — ONDANSETRON 4 MG/1
4 TABLET, ORALLY DISINTEGRATING ORAL EVERY 6 HOURS PRN
Qty: 20 TABLET | Refills: 0 | Status: SHIPPED | OUTPATIENT
Start: 2024-09-23

## 2024-09-23 RX ORDER — DICYCLOMINE HYDROCHLORIDE 20 MG/1
20 TABLET ORAL EVERY 6 HOURS PRN
Qty: 20 TABLET | Refills: 0 | Status: SHIPPED | OUTPATIENT
Start: 2024-09-23

## 2024-09-23 NOTE — PROGRESS NOTES
"Subjective:      Patient ID: Sherita Alejandra is a 23 y.o. female.    Vitals:  height is 5' 2" (1.575 m) and weight is 109.8 kg (242 lb 1 oz). Her oral temperature is 98.1 °F (36.7 °C). Her blood pressure is 127/84 and her pulse is 86. Her respiration is 14 and oxygen saturation is 97%.     Chief Complaint: Abdominal Pain    24 y/o F here for abdominal pain and nausea x3 days. Looser stools than usual. Pain is periumbical and intermittent. Sometimes sharp. Not associated with food intake. No aggravating or alleviating factors. She did take pepto bismol without relief. No vomiting. Denies fever, chills, dysuria, frequency, urgency, back pain, pelvic pain, chest pain, SoB.    Abdominal Pain  This is a new problem. Episode onset: 2 days ago. The onset quality is sudden. The problem occurs intermittently. The problem has been unchanged. The pain is located in the epigastric region. The pain is at a severity of 5/10. The pain is moderate. The quality of the pain is sharp and aching. The abdominal pain radiates to the LLQ and RUQ. Associated symptoms include diarrhea and nausea. Pertinent negatives include no anorexia, arthralgias, constipation, dysuria, fever, flatus, frequency, headaches, hematochezia, hematuria, melena, myalgias, vomiting or weight loss. Nothing aggravates the pain. The pain is relieved by Nothing. Treatments tried: pepto bismo. The treatment provided no relief. There is no history of abdominal surgery, colon cancer, Crohn's disease, gallstones, GERD, irritable bowel syndrome, pancreatitis, PUD or ulcerative colitis. Patient's medical history does not include kidney stones and UTI.       Constitution: Negative for chills and fever.   HENT:  Negative for ear pain and congestion.    Neck: Negative for neck pain.   Cardiovascular:  Negative for chest pain.   Respiratory:  Negative for cough.    Gastrointestinal:  Positive for abdominal pain, nausea and diarrhea. Negative for history of abdominal surgery, " vomiting, constipation and bright red blood in stool.   Genitourinary:  Negative for dysuria, frequency, urgency, hematuria and pelvic pain.   Musculoskeletal:  Negative for joint pain, joint swelling and muscle ache.   Skin:  Negative for rash.   Neurological:  Negative for dizziness and headaches.      Objective:     Physical Exam   Constitutional: She is oriented to person, place, and time. She appears well-developed.   HENT:   Head: Normocephalic and atraumatic.   Ears:   Right Ear: External ear normal.   Left Ear: External ear normal.   Nose: Nose normal.   Mouth/Throat: Oropharynx is clear and moist.   Eyes: Conjunctivae, EOM and lids are normal. Pupils are equal, round, and reactive to light.   Neck: Trachea normal and phonation normal. Neck supple.   Cardiovascular: Normal rate, regular rhythm, normal heart sounds and normal pulses.   Pulmonary/Chest: Breath sounds normal.   Abdominal: She exhibits no distension. Soft. There is no abdominal tenderness. There is no rebound and no guarding.   Musculoskeletal: Normal range of motion.         General: Normal range of motion.   Neurological: She is alert and oriented to person, place, and time.   Skin: Skin is warm, dry and intact.   Psychiatric: Her speech is normal and behavior is normal. Judgment and thought content normal.   Nursing note and vitals reviewed.    Results for orders placed or performed in visit on 09/23/24   POCT Urinalysis(Instrument)   Result Value Ref Range    Color, POC UA Yellow Yellow, Straw, Colorless    Clarity, POC UA Clear Clear    Glucose, POC UA Negative Negative    Bilirubin, POC UA Negative Negative    Ketones, POC UA Negative Negative    Spec Grav POC UA 1.020 1.005 - 1.030    Blood, POC UA Trace-intact (A) Negative    pH, POC UA 6.5 5.0 - 8.0    Protein, POC UA Negative Negative    Urobilinogen, POC UA 0.2 <=1.0    Nitrite, POC UA Negative Negative    WBC, POC UA Negative Negative   POCT urine pregnancy   Result Value Ref Range     POC Preg Test, Ur Negative Negative     Acceptable Yes      Assessment:     1. Acute gastroenteritis        Plan:     Acute gastroenteritis  -     POCT Urinalysis(Instrument)  -     POCT urine pregnancy  -     ondansetron (ZOFRAN-ODT) 4 MG TbDL; Take 1 tablet (4 mg total) by mouth every 6 (six) hours as needed (nausea).  Dispense: 20 tablet; Refill: 0  -     dicyclomine (BENTYL) 20 mg tablet; Take 1 tablet (20 mg total) by mouth every 6 (six) hours as needed (abdominal pain).  Dispense: 20 tablet; Refill: 0                Patient Instructions   - Rest.    - Drink plenty of fluids.  - Pedialyte, Gatorade/powerade, water     - Tylenol or Ibuprofen as directed as needed for fever/pain.      - Take zofran (ondansetron) for nausea     - can take over-the-counter Pepto-Bismol to help with diarrhea.     -Dicyclomine is an anti-spasmodic that helps with stomach pain/cramps associated with diarrhea. It is to be taken only as needed.      - Follow up with your PCP or specialty clinic as directed in the next 2-3 days if not improved or as needed.  You can call [phone] to schedule an appointment with the appropriate provider.    - Go to the ER if you develop any new or worsening symptoms     - You must understand that you have received an Urgent Care treatment only and that you may be released before all of your medical problems are known or treated.   - You, the patient, will arrange for follow up care as instructed.   - If your condition worsens or fails to improve we recommend that you receive another evaluation at the ER immediately or contact your PCP to discuss your concerns or return here.

## 2024-09-23 NOTE — LETTER
September 23, 2024      [facility]  [address]  [place] [address]  Phone: [phone]  Fax: [phone]       Patient: Sherita Alejandra   YOB: 2000  Date of Visit: 09/23/2024    To Whom It May Concern:    Afua Alejandra  was at [facility] on 09/23/2024. The patient may return to work on 9/24/24. If you have any questions or concerns, or if I can be of further assistance, please do not hesitate to contact me.    Sincerely,    Kyler Hercules PA-C

## 2024-09-23 NOTE — PATIENT INSTRUCTIONS
- Rest.    - Drink plenty of fluids.  - Pedialyte, Gatorade/powerade, water     - Tylenol or Ibuprofen as directed as needed for fever/pain.      - Take zofran (ondansetron) for nausea     - can take over-the-counter Pepto-Bismol to help with diarrhea.     -Dicyclomine is an anti-spasmodic that helps with stomach pain/cramps associated with diarrhea. It is to be taken only as needed.      - Follow up with your PCP or specialty clinic as directed in the next 2-3 days if not improved or as needed.  You can call [phone] to schedule an appointment with the appropriate provider.    - Go to the ER if you develop any new or worsening symptoms     - You must understand that you have received an Urgent Care treatment only and that you may be released before all of your medical problems are known or treated.   - You, the patient, will arrange for follow up care as instructed.   - If your condition worsens or fails to improve we recommend that you receive another evaluation at the ER immediately or contact your PCP to discuss your concerns or return here.

## 2024-10-07 ENCOUNTER — PATIENT MESSAGE (OUTPATIENT)
Dept: FAMILY MEDICINE | Facility: CLINIC | Age: 24
End: 2024-10-07
Payer: COMMERCIAL

## 2024-10-10 ENCOUNTER — E-VISIT (OUTPATIENT)
Dept: FAMILY MEDICINE | Facility: CLINIC | Age: 24
End: 2024-10-10
Payer: COMMERCIAL

## 2024-10-10 DIAGNOSIS — E66.01 CLASS 3 SEVERE OBESITY DUE TO EXCESS CALORIES WITHOUT SERIOUS COMORBIDITY WITH BODY MASS INDEX (BMI) OF 40.0 TO 44.9 IN ADULT: Primary | ICD-10-CM

## 2024-10-10 DIAGNOSIS — E66.813 CLASS 3 SEVERE OBESITY DUE TO EXCESS CALORIES WITHOUT SERIOUS COMORBIDITY WITH BODY MASS INDEX (BMI) OF 40.0 TO 44.9 IN ADULT: Primary | ICD-10-CM

## 2024-10-10 RX ORDER — TIRZEPATIDE 10 MG/.5ML
10 INJECTION, SOLUTION SUBCUTANEOUS
Qty: 4 PEN | Refills: 2 | Status: SHIPPED | OUTPATIENT
Start: 2024-10-10 | End: 2024-10-10

## 2024-10-10 RX ORDER — TIRZEPATIDE 10 MG/.5ML
10 INJECTION, SOLUTION SUBCUTANEOUS
Qty: 4 PEN | Refills: 2 | Status: SHIPPED | OUTPATIENT
Start: 2024-10-10

## 2024-10-10 NOTE — PROGRESS NOTES
Patient ID: Sherita Alejandra is a 23 y.o. female.    This note was created by combination of typed  and M-Modal dictation.  Transcription errors may be present.  If there are any questions, please contact me.    Assessment and Plan:   1. Class 3 severe obesity due to excess calories without serious comorbidity with body mass index (BMI) of 40.0 to 44.9 in adult  Finding tirzepatide helpful.  Last refill was 7.5 mg.  Will increase to 10 mg.  Will leave 7.5 mg on the list in case of drug shortage.  If 10 mg not available then stay on 7.5.  Plan to maintain for 3 months and then would have her follow up in 3 months to review and discuss any further titration  - tirzepatide, weight loss, (ZEPBOUND) 10 mg/0.5 mL PnIj; Inject 10 mg into the skin every 7 days.  Dispense: 4 Pen; Refill: 2        No orders of the defined types were placed in this encounter.     Medications Ordered This Encounter   Medications    tirzepatide, weight loss, (ZEPBOUND) 10 mg/0.5 mL PnIj     Sig: Inject 10 mg into the skin every 7 days.     Dispense:  4 Pen     Refill:  2        No follow-ups on file. or sooner as needed.        E-Visit Time Trackin}    Follow Up: No follow-ups on file.    Subjective:   Patient ID: Sherita Alejandra is a 23 y.o. female.    Chief Complaint: General Illness (Entered automatically based on patient selection in [facility].)                    [id number]}    History of Present Illness:  The patient initiated a request through [facility] on 10/10/2024 for evaluation and management with a chief complaint of General Illness (Entered automatically based on patient selection in [facility].)     History of right tympanoplasty 2022.     saw ENT. Eustachian tube dysfunction.  Flonase and astelin  History of anxiety.  I previously referred her to psychiatry department for therapy/counseling.  Not on SSRI  History of PCOS  Migraines.  History of propranolol possible side effect drug rash.  However  felt to be due to alternate etiology and plan was to resume.  Cymbalta, Imitrex, Topamax  Saw Neurology 04/04/2023. Rimegepant PRN instead of imitrex due to SE  Saw neuro 3/25/24  Add riboflavin and mag ox.  Rimegepant PRN  Home sleep test negative for sleep apnea    Virtual visit with me 07/02/2024.  Obesity trial of tirzepatide.  Most recent refill for Zepbound 7.5    I evaluated the questionnaire submission on 10/10/2024 .    E-visit Questionnaire:  [facility] P[facility] Evisit [facility]-Medication    10/10/2024  7:19 AM CDT - Filed by Patient   What do you need help with? Medication Request   Do you agree to participate in an E-Visit? Yes   If you have any of the following symptoms, please present to your local emergency room or call 911:  I acknowledge   Medication requests for narcotics will not be addressed via an E-Visit.  Please schedule an appointment. I acknowledge   Are you pregnant, could you be pregnant, or are you breast feeding? None of the above   Do you want to address a new or existing medication? I would like to address a medication I currently take   What is the main issue you would like addressed today? Zepbound Refill   Would you like to change or continue your medication? Continue medication   What medication would you like to continue?  Zepbound   Are you taking it as prescribed? Yes    What medical condition is the  medication intended to treat? Weight management   Is the medication helping your condition? Yes   Are you having any side effects from the medication? No   Provide any additional information you feel is important. I have lost about 10 pounds since starting the medication.   Please attach any relevant images or files    Are you able to take your vital signs? No          Problem List:  Active Problem List with Overview Notes    Diagnosis Date Noted    PCOS (polycystic ovarian syndrome) 07/01/2024    Class 3 severe obesity due to excess calories without serious comorbidity with body mass index  (BMI) of 40.0 to 44.9 in adult 02/06/2024    Migraine without aura and without status migrainosus, not intractable 12/08/2021    Snoring     Other headache syndrome; 8/2021 HST negative; referred to neuro 05/28/2019    Other abnormal glucose 05/28/2019    Iron deficiency anemia 09/29/2016    BMI, pediatric, 99th percentile or greater for age 09/28/2016    Hyperinsulinism 05/13/2015    Acanthosis nigricans 05/13/2015    Allergic rhinitis 11/13/2012        Recent Labs Obtained:  No visits with results within 7 Day(s) from this visit.   Latest known visit with results is:   Office Visit on 09/23/2024   Component Date Value Ref Range Status    Color, POC UA 09/23/2024 Yellow  Yellow, Straw, Colorless Final    Clarity, POC UA 09/23/2024 Clear  Clear Final    Glucose, POC UA 09/23/2024 Negative  Negative Final    Bilirubin, POC UA 09/23/2024 Negative  Negative Final    Ketones, POC UA 09/23/2024 Negative  Negative Final    Spec Grav POC UA 09/23/2024 1.020  1.005 - 1.030 Final    Blood, POC UA 09/23/2024 Trace-intact (A)  Negative Final    pH, POC UA 09/23/2024 6.5  5.0 - 8.0 Final    Protein, POC UA 09/23/2024 Negative  Negative Final    Urobilinogen, POC UA 09/23/2024 0.2  <=1.0 Final    Nitrite, POC UA 09/23/2024 Negative  Negative Final    WBC, POC UA 09/23/2024 Negative  Negative Final    POC Preg Test, Ur 09/23/2024 Negative  Negative Final     Acceptable 09/23/2024 Yes   Final         ALLERGIES AND MEDICATIONS: updated and reviewed.  Review of patient's allergies indicates:  No Known Allergies    Medication List with Changes/Refills   Current Medications    AZELASTINE (ASTELIN) 137 MCG (0.1 %) NASAL SPRAY    1 spray (137 mcg total) by Nasal route 2 (two) times daily.    CETIRIZINE (ZYRTEC) 10 MG TABLET    Take 10 mg by mouth.    DICYCLOMINE (BENTYL) 20 MG TABLET    Take 1 tablet (20 mg total) by mouth every 6 (six) hours as needed (abdominal pain).    DULOXETINE (CYMBALTA) 60 MG CAPSULE    Take 1  capsule (60 mg total) by mouth once daily.    FLUTICASONE PROPIONATE (FLONASE) 50 MCG/ACTUATION NASAL SPRAY    SPRAY 1 SPRAY INTO EACH NOSTRIL TWICE A DAY    MAGNESIUM OXIDE (MAG-OX) 400 MG (241.3 MG MAGNESIUM) TABLET    Take 1 tablet (400 mg total) by mouth once daily.    MEDROXYPROGESTERONE (PROVERA) 10 MG TABLET    Take one tablet by mouth x 10 days every month    MULTIVIT WITH CALCIUM,IRON,MIN (WOMEN'S DAILY MULTIVITAMIN ORAL)    Take by mouth.    ONDANSETRON (ZOFRAN-ODT) 4 MG TBDL    Take 1 tablet (4 mg total) by mouth every 6 (six) hours as needed (nausea).    RIBOFLAVIN, VITAMIN B2, 400 MG TAB    Take 400 mg by mouth once daily.    RIMEGEPANT (NURTEC) 75 MG ODT    Take 1 tablet (75 mg total) by mouth as needed for Migraine. Place ODT tablet on the tongue; alternatively the ODT tablet may be placed under the tongue. DO not exceed more than 1 tablet in 24 hrs    SPIRONOLACTONE (ALDACTONE) 100 MG TABLET    Take 1 tablet (100 mg total) by mouth once daily.    TIRZEPATIDE, WEIGHT LOSS, (ZEPBOUND) 2.5 MG/0.5 ML PNIJ    Inject 2.5 mg into the skin every 7 days. Month 1    TIRZEPATIDE, WEIGHT LOSS, (ZEPBOUND) 5 MG/0.5 ML PNIJ    Inject 5 mg into the skin every 7 days. Month 2    TIRZEPATIDE, WEIGHT LOSS, (ZEPBOUND) 7.5 MG/0.5 ML PNIJ    Inject 7.5 mg into the skin every 7 days. Month 3    TOPIRAMATE (TOPAMAX) 100 MG TABLET    Take 1 tablet (100 mg total) by mouth 2 (two) times daily.    TRETINOIN (RETIN-A) 0.025 % GEL    Apply small amount to entire face qhs. Wash off qam and apply sunscreen.

## 2024-12-13 ENCOUNTER — PATIENT MESSAGE (OUTPATIENT)
Dept: NEUROLOGY | Facility: CLINIC | Age: 24
End: 2024-12-13
Payer: COMMERCIAL

## 2025-01-06 ENCOUNTER — E-VISIT (OUTPATIENT)
Dept: FAMILY MEDICINE | Facility: CLINIC | Age: 25
End: 2025-01-06
Payer: COMMERCIAL

## 2025-01-06 DIAGNOSIS — H66.90 OTITIS MEDIA, UNSPECIFIED LATERALITY, UNSPECIFIED OTITIS MEDIA TYPE: Primary | ICD-10-CM

## 2025-01-06 RX ORDER — AMOXICILLIN AND CLAVULANATE POTASSIUM 875; 125 MG/1; MG/1
1 TABLET, FILM COATED ORAL 2 TIMES DAILY
Qty: 20 TABLET | Refills: 0 | Status: SHIPPED | OUTPATIENT
Start: 2025-01-06 | End: 2025-01-16

## 2025-01-06 NOTE — PROGRESS NOTES
-seen on last labs  -started on iron after last visit  -rechecking labs today  -scheduling for colonoscopy   Patient ID: Sherita Alejandra is a 24 y.o. female.    This note was created by combination of typed  and M-Modal dictation.  Transcription errors may be present.  If there are any questions, please contact me.    Assessment and Plan:   1. Otitis media, unspecified laterality, unspecified otitis media type  History of Eustachian tube dysfunction.  Evolving respiratory infection type symptoms now with ear pain.  Will treat for possible otitis media with Augmentin  - amoxicillin-clavulanate 875-125mg (AUGMENTIN) 875-125 mg per tablet; Take 1 tablet by mouth 2 (two) times daily. for 10 days  Dispense: 20 tablet; Refill: 0        No orders of the defined types were placed in this encounter.     Medications Ordered This Encounter   Medications    amoxicillin-clavulanate 875-125mg (AUGMENTIN) 875-125 mg per tablet     Sig: Take 1 tablet by mouth 2 (two) times daily. for 10 days     Dispense:  20 tablet     Refill:  0        No follow-ups on file. or sooner as needed.        E-Visit Time Tracking:    Day 1, 11 min               274}    Follow Up: No follow-ups on file.    Subjective:   Patient ID: Sherita Alejandra is a 24 y.o. female.    Chief Complaint: General Illness (Entered automatically based on patient selection in [facility].)                    274}    History of Present Illness:  The patient initiated a request through [facility] on 1/6/2025 for evaluation and management with a chief complaint of General Illness (Entered automatically based on patient selection in [facility].)     History of right tympanoplasty 07/06/2022.    History of anxiety.  I previously referred her to psychiatry department for therapy/counseling.  Not on SSRI  PCOS, spironolactone.  Followed by Gynecology.  Provera.  Migraines.  History of propranolol possible side effect drug rash.  However felt to be due to alternate etiology and plan was to resume. Saw Neurology 04/04/2023.   Saw Neurology in follow-up in March.  Cymbalta, Imitrex,  Topamax  Rimegepant PRN instead of imitrex due to SE  Saw neuro 3/25 Add riboflavin and mag ox.  Rimegepant PRN  Home sleep test negative for sleep apnea  Eustachian tube dysfunction seen by ENT.  Most recently 03/28/2024  Obesity, 07/02/2024 trial of Zepbound    Complaining of respiratory infection type symptoms for 6 days with new ear pain.    I evaluated the questionnaire submission on 01/06/2025 .    E-visit Questionnaire:  [facility] Peq Evisit Supergroup-Cough And Cold    1/6/2025  5:44 AM CST - Filed by Patient   What do you need help with? Cough, Cold, Sore Throat   Do you agree to participate in an E-Visit? Yes   If you have any of the following symptoms, go to your local emergency room or call 911: I acknowledge   Do you have any of the following pregnancy-related conditions? None   What is the main issue you would like addressed today? Cough, mucus, ear pain   Do you think you might have COVID or the Flu? No   Have you tested positive for COVID or Flu? No   What symptoms do you currently have?  Cough;  Muscle or body aches;  Runny nose;  Ear pain   Describe your cough: Productive (containing mucus);  Dry   Describe the mucus: Green;  Yellow   Have you ever smoked? I have never smoked   Have you had a fever? No   When did your symptoms first appear? 1/1/2025   In the last two weeks, have you been in close contact with someone who has COVID-19 or the Flu? No   List what you have done or taken to help your symptoms. Mucinex   How severe are your symptoms? Mild   Have your symptoms gotten better or worse since they started?  Worse   Do you have transportation to get testing if it is needed and ordered for you at an [facility] location? No   Provide any additional information you feel is important. The cough and mucus had gotten better since I first started feeling bad. However, my ear is now in a lot of pain.   Please attach any relevant images or files    Are you able to take your vital signs? No          Problem  List:  Active Problem List with Overview Notes    Diagnosis Date Noted    PCOS (polycystic ovarian syndrome) 07/01/2024    Class 3 severe obesity due to excess calories without serious comorbidity with body mass index (BMI) of 40.0 to 44.9 in adult 02/06/2024    Migraine without aura and without status migrainosus, not intractable 12/08/2021    Snoring     Other headache syndrome; 8/2021 HST negative; referred to neuro 05/28/2019    Other abnormal glucose 05/28/2019    Iron deficiency anemia 09/29/2016    BMI, pediatric, 99th percentile or greater for age 09/28/2016    Hyperinsulinism 05/13/2015    Acanthosis nigricans 05/13/2015    Allergic rhinitis 11/13/2012        Recent Labs Obtained:  No visits with results within 7 Day(s) from this visit.   Latest known visit with results is:   Office Visit on 09/23/2024   Component Date Value Ref Range Status    Color, POC UA 09/23/2024 Yellow  Yellow, Straw, Colorless Final    Clarity, POC UA 09/23/2024 Clear  Clear Final    Glucose, POC UA 09/23/2024 Negative  Negative Final    Bilirubin, POC UA 09/23/2024 Negative  Negative Final    Ketones, POC UA 09/23/2024 Negative  Negative Final    Spec Grav POC UA 09/23/2024 1.020  1.005 - 1.030 Final    Blood, POC UA 09/23/2024 Trace-intact (A)  Negative Final    pH, POC UA 09/23/2024 6.5  5.0 - 8.0 Final    Protein, POC UA 09/23/2024 Negative  Negative Final    Urobilinogen, POC UA 09/23/2024 0.2  <=1.0 Final    Nitrite, POC UA 09/23/2024 Negative  Negative Final    WBC, POC UA 09/23/2024 Negative  Negative Final    POC Preg Test, Ur 09/23/2024 Negative  Negative Final     Acceptable 09/23/2024 Yes   Final         ALLERGIES AND MEDICATIONS: updated and reviewed.  Review of patient's allergies indicates:  No Known Allergies    Medication List with Changes/Refills   Current Medications    AZELASTINE (ASTELIN) 137 MCG (0.1 %) NASAL SPRAY    1 spray (137 mcg total) by Nasal route 2 (two) times daily.    CETIRIZINE  (ZYRTEC) 10 MG TABLET    Take 10 mg by mouth.    DICYCLOMINE (BENTYL) 20 MG TABLET    Take 1 tablet (20 mg total) by mouth every 6 (six) hours as needed (abdominal pain).    DULOXETINE (CYMBALTA) 60 MG CAPSULE    Take 1 capsule (60 mg total) by mouth once daily.    FLUTICASONE PROPIONATE (FLONASE) 50 MCG/ACTUATION NASAL SPRAY    SPRAY 1 SPRAY INTO EACH NOSTRIL TWICE A DAY    MAGNESIUM OXIDE (MAG-OX) 400 MG (241.3 MG MAGNESIUM) TABLET    Take 1 tablet (400 mg total) by mouth once daily.    MEDROXYPROGESTERONE (PROVERA) 10 MG TABLET    Take one tablet by mouth x 10 days every month    MULTIVIT WITH CALCIUM,IRON,MIN (WOMEN'S DAILY MULTIVITAMIN ORAL)    Take by mouth.    ONDANSETRON (ZOFRAN-ODT) 4 MG TBDL    Take 1 tablet (4 mg total) by mouth every 6 (six) hours as needed (nausea).    RIBOFLAVIN, VITAMIN B2, 400 MG TAB    Take 400 mg by mouth once daily.    RIMEGEPANT (NURTEC) 75 MG ODT    Take 1 tablet (75 mg total) by mouth as needed for Migraine. Place ODT tablet on the tongue; alternatively the ODT tablet may be placed under the tongue. DO not exceed more than 1 tablet in 24 hrs    SPIRONOLACTONE (ALDACTONE) 100 MG TABLET    Take 1 tablet (100 mg total) by mouth once daily.    TIRZEPATIDE, WEIGHT LOSS, (ZEPBOUND) 10 MG/0.5 ML PNIJ    Inject 10 mg into the skin every 7 days.    TIRZEPATIDE, WEIGHT LOSS, (ZEPBOUND) 7.5 MG/0.5 ML PNIJ    Inject 7.5 mg into the skin every 7 days. Month 3    TOPIRAMATE (TOPAMAX) 100 MG TABLET    Take 1 tablet (100 mg total) by mouth 2 (two) times daily.    TRETINOIN (RETIN-A) 0.025 % GEL    Apply small amount to entire face qhs. Wash off qam and apply sunscreen.

## 2025-01-14 ENCOUNTER — E-VISIT (OUTPATIENT)
Dept: OBSTETRICS AND GYNECOLOGY | Facility: CLINIC | Age: 25
End: 2025-01-14
Payer: COMMERCIAL

## 2025-01-14 DIAGNOSIS — B37.9 YEAST INFECTION: Primary | ICD-10-CM

## 2025-01-14 PROCEDURE — 99421 OL DIG E/M SVC 5-10 MIN: CPT | Mod: ,,, | Performed by: OBSTETRICS & GYNECOLOGY

## 2025-01-16 RX ORDER — FLUCONAZOLE 150 MG/1
150 TABLET ORAL DAILY
Qty: 1 TABLET | Refills: 0 | Status: SHIPPED | OUTPATIENT
Start: 2025-01-16 | End: 2025-01-17

## 2025-01-16 NOTE — PROGRESS NOTES
Patient ID: Sherita Alejandra is a 24 y.o. female.    Chief Complaint: General Illness (Entered automatically based on patient selection in [facility].)    The patient initiated a request through [facility] on 1/14/2025 for evaluation and management with a chief complaint of General Illness (Entered automatically based on patient selection in [facility].)     I evaluated the questionnaire submission on 1/14/25.    [facility] Peq Evisit [facility]-Obgyn    1/14/2025  8:39 PM CST - Filed by Patient   What do you need help with? Vaginal Concerns   Do you agree to participate in an E-Visit? Yes   If you have any of the following symptoms,  please do not complete an E-Visit,  schedule an appointment with your provider: I acknowledge   Do you have any of the following pregnancy-related conditions? None   What is the main issue you would like addressed today? Hi! Im pretty sure I have a yeast infection. The outside area and a little bit inside is very red. Also it itches and burns a lot. I thought it was a UTI ag first, so I treated that and I am still experiencing pain.   Which of the following vaginal concerns do you have? Bleeding;  Dryness;  Itching;  Pain   Do you have vaginal discharge? Clear discharge   Do you have pain while passing urine? No   Do you have any of the following symptoms? None of the above    Have you taken antibiotics in the last two weeks? Yes   What is the name of the antibiotic? Augmentin    Do you use any of the following? Bubble baths   Which of the following applies to your menstrual period? Periods are unpredictable   Which of the following applies to your menstrual cycle? Heavier bleeding than normal   Do you have spotting between periods? Yes   Do you have pain with your period? Yes   What type of pain do you have with your period? Achy   Have you had similar symptoms in the past? No   Have you had a temperature of 100.4 or higher? No   Provide any additional information you feel is important. Having  brown/ bloody discharge. Itching both inside and out.   Please attach any relevant images or files    Are you able to take your vital signs? No         No diagnosis found.     No orders of the defined types were placed in this encounter.     Medications Ordered This Encounter   Medications    fluconazole (DIFLUCAN) 150 MG Tab     Sig: Take 1 tablet (150 mg total) by mouth once daily. for 1 day     Dispense:  1 tablet     Refill:  0        No follow-ups on file.      E-Visit Time Tracking:

## 2025-01-18 ENCOUNTER — PATIENT MESSAGE (OUTPATIENT)
Dept: FAMILY MEDICINE | Facility: CLINIC | Age: 25
End: 2025-01-18
Payer: COMMERCIAL

## 2025-01-27 ENCOUNTER — PATIENT MESSAGE (OUTPATIENT)
Dept: FAMILY MEDICINE | Facility: CLINIC | Age: 25
End: 2025-01-27
Payer: COMMERCIAL

## 2025-01-27 DIAGNOSIS — E66.01 CLASS 3 SEVERE OBESITY DUE TO EXCESS CALORIES WITHOUT SERIOUS COMORBIDITY WITH BODY MASS INDEX (BMI) OF 40.0 TO 44.9 IN ADULT: Primary | ICD-10-CM

## 2025-01-27 DIAGNOSIS — E66.813 CLASS 3 SEVERE OBESITY DUE TO EXCESS CALORIES WITHOUT SERIOUS COMORBIDITY WITH BODY MASS INDEX (BMI) OF 40.0 TO 44.9 IN ADULT: Primary | ICD-10-CM

## 2025-01-28 RX ORDER — TIRZEPATIDE 12.5 MG/.5ML
12.5 INJECTION, SOLUTION SUBCUTANEOUS
Qty: 2 ML | Refills: 0 | Status: SHIPPED | OUTPATIENT
Start: 2025-01-28 | End: 2025-02-27

## 2025-01-28 RX ORDER — TIRZEPATIDE 15 MG/.5ML
15 INJECTION, SOLUTION SUBCUTANEOUS
Qty: 4 PEN | Refills: 3 | Status: SHIPPED | OUTPATIENT
Start: 2025-02-28

## 2025-02-11 ENCOUNTER — OFFICE VISIT (OUTPATIENT)
Dept: URGENT CARE | Facility: CLINIC | Age: 25
End: 2025-02-11
Payer: COMMERCIAL

## 2025-02-11 VITALS
WEIGHT: 242 LBS | RESPIRATION RATE: 18 BRPM | HEART RATE: 77 BPM | BODY MASS INDEX: 44.53 KG/M2 | SYSTOLIC BLOOD PRESSURE: 127 MMHG | OXYGEN SATURATION: 98 % | DIASTOLIC BLOOD PRESSURE: 87 MMHG | HEIGHT: 62 IN | TEMPERATURE: 98 F

## 2025-02-11 DIAGNOSIS — R10.9 ABDOMINAL PAIN, UNSPECIFIED ABDOMINAL LOCATION: ICD-10-CM

## 2025-02-11 DIAGNOSIS — K59.00 CONSTIPATION, UNSPECIFIED CONSTIPATION TYPE: Primary | ICD-10-CM

## 2025-02-11 DIAGNOSIS — R05.9 COUGH, UNSPECIFIED TYPE: ICD-10-CM

## 2025-02-11 LAB
B-HCG UR QL: NEGATIVE
BILIRUBIN, UA POC OHS: NEGATIVE
BLOOD, UA POC OHS: NEGATIVE
CLARITY, UA POC OHS: CLEAR
COLOR, UA POC OHS: YELLOW
CTP QC/QA: YES
CTP QC/QA: YES
GLUCOSE, UA POC OHS: NEGATIVE
KETONES, UA POC OHS: NEGATIVE
LEUKOCYTES, UA POC OHS: NEGATIVE
NITRITE, UA POC OHS: NEGATIVE
PH, UA POC OHS: 6
POC MOLECULAR INFLUENZA A AGN: NEGATIVE
POC MOLECULAR INFLUENZA B AGN: NEGATIVE
PROTEIN, UA POC OHS: NEGATIVE
SPECIFIC GRAVITY, UA POC OHS: 1.02
UROBILINOGEN, UA POC OHS: 0.2

## 2025-02-11 PROCEDURE — 74018 RADEX ABDOMEN 1 VIEW: CPT | Mod: S$GLB,,, | Performed by: RADIOLOGY

## 2025-02-11 PROCEDURE — 99213 OFFICE O/P EST LOW 20 MIN: CPT | Mod: S$GLB,,, | Performed by: NURSE PRACTITIONER

## 2025-02-11 PROCEDURE — 87502 INFLUENZA DNA AMP PROBE: CPT | Mod: QW,S$GLB,, | Performed by: NURSE PRACTITIONER

## 2025-02-11 PROCEDURE — 81003 URINALYSIS AUTO W/O SCOPE: CPT | Mod: QW,S$GLB,, | Performed by: NURSE PRACTITIONER

## 2025-02-11 PROCEDURE — 81025 URINE PREGNANCY TEST: CPT | Mod: S$GLB,,, | Performed by: NURSE PRACTITIONER

## 2025-02-11 RX ORDER — BENZONATATE 100 MG/1
200 CAPSULE ORAL 3 TIMES DAILY PRN
Qty: 60 CAPSULE | Refills: 0 | Status: SHIPPED | OUTPATIENT
Start: 2025-02-11 | End: 2025-02-21

## 2025-02-11 RX ORDER — POLYETHYLENE GLYCOL 3350 17 G/17G
17 POWDER, FOR SOLUTION ORAL DAILY
Qty: 3 EACH | Refills: 0 | Status: SHIPPED | OUTPATIENT
Start: 2025-02-11 | End: 2025-02-14

## 2025-02-11 RX ORDER — BISACODYL 5 MG
5 TABLET, DELAYED RELEASE (ENTERIC COATED) ORAL DAILY PRN
Qty: 3 TABLET | Refills: 0 | Status: SHIPPED | OUTPATIENT
Start: 2025-02-11 | End: 2025-02-14

## 2025-02-11 NOTE — PROGRESS NOTES
"Subjective:      Patient ID: Sherita Alejandra is a 24 y.o. female.    Vitals:  height is 5' 2" (1.575 m) and weight is 109.8 kg (242 lb). Her oral temperature is 97.8 °F (36.6 °C). Her blood pressure is 127/87 and her pulse is 77. Her respiration is 18 and oxygen saturation is 98%.     Chief Complaint: Cough    Pt states she has had  congestion and stomach pain for a week . Pt took OTC pain meds with no relief .  Patient reports she was she was coughing more last week but now with abdominal discomfort she has noticed that she has not had a formed stool since Saturday.  Has 1 bout of diarrhea Sunday.  Patient reports negative issues with urination such as dysuria or urgency.    Cough  This is a new problem. Episode onset: a week. The problem has been unchanged. Associated symptoms include nasal congestion and postnasal drip. Associated symptoms comments: Stomach pain   . She has tried OTC cough suppressant for the symptoms. The treatment provided no relief.       HENT:  Positive for postnasal drip.    Respiratory:  Positive for cough.    Gastrointestinal:  Positive for abdominal pain, abdominal bloating, constipation and diarrhea. Negative for nausea.        Patient reports feeling of fullness to abdomen.      Objective:     Physical Exam   Constitutional: She is oriented to person, place, and time. She appears well-developed. She is cooperative.  Non-toxic appearance. She does not appear ill. No distress.   HENT:   Head: Normocephalic and atraumatic.   Ears:   Right Ear: Hearing, tympanic membrane, external ear and ear canal normal.   Left Ear: Hearing, tympanic membrane, external ear and ear canal normal.   Nose: Nose normal. No mucosal edema, rhinorrhea, nasal deformity or congestion. No epistaxis. Right sinus exhibits no maxillary sinus tenderness and no frontal sinus tenderness. Left sinus exhibits no maxillary sinus tenderness and no frontal sinus tenderness.   Mouth/Throat: Uvula is midline, oropharynx is " clear and moist and mucous membranes are normal. Mucous membranes are moist. No trismus in the jaw. Normal dentition. No uvula swelling. No oropharyngeal exudate, posterior oropharyngeal edema or posterior oropharyngeal erythema. Oropharynx is clear.   Eyes: Conjunctivae and lids are normal. Pupils are equal, round, and reactive to light. No scleral icterus. Extraocular movement intact   Neck: Trachea normal and phonation normal. Neck supple. No thyromegaly present. No edema present. No erythema present. No neck rigidity present.   Cardiovascular: Normal rate, regular rhythm, S1 normal, S2 normal, normal heart sounds and normal pulses.   Pulmonary/Chest: Effort normal and breath sounds normal. No respiratory distress. She has no decreased breath sounds. She has no wheezes. She has no rhonchi. She has no rales. She exhibits no tenderness.   Abdominal: Normal appearance and bowel sounds are normal. She exhibits no distension. Soft. protuberant There is abdominal tenderness. There is no guarding, no left CVA tenderness and no right CVA tenderness.   Musculoskeletal: Normal range of motion.         General: No deformity. Normal range of motion.      Right lower leg: No edema.      Left lower leg: No edema.   Lymphadenopathy:     She has no cervical adenopathy.   Neurological: no focal deficit. She is alert and oriented to person, place, and time. She exhibits normal muscle tone. Coordination normal.   Skin: Skin is warm, dry, intact, not diaphoretic and not pale. Capillary refill takes less than 2 seconds.   Psychiatric: Her speech is normal and behavior is normal. Mood, judgment and thought content normal.   Nursing note and vitals reviewed.      Results for orders placed or performed in visit on 02/11/25   POCT Influenza A/B MOLECULAR    Collection Time: 02/11/25  5:27 PM   Result Value Ref Range    POC Molecular Influenza A Ag Negative Negative    POC Molecular Influenza B Ag Negative Negative      Acceptable Yes    POCT urine pregnancy    Collection Time: 02/11/25  5:43 PM   Result Value Ref Range    POC Preg Test, Ur Negative Negative     Acceptable Yes    POCT Urinalysis(Instrument)    Collection Time: 02/11/25  5:45 PM   Result Value Ref Range    Color, POC UA Yellow Yellow, Straw, Colorless    Clarity, POC UA Clear Clear    Glucose, POC UA Negative Negative    Bilirubin, POC UA Negative Negative    Ketones, POC UA Negative Negative    Spec Grav POC UA 1.020 1.005 - 1.030    Blood, POC UA Negative Negative    pH, POC UA 6.0 5.0 - 8.0    Protein, POC UA Negative Negative    Urobilinogen, POC UA 0.2 <=1.0    Nitrite, POC UA Negative Negative    WBC, POC UA Negative Negative       Assessment:     1. Constipation, unspecified constipation type    2. Cough, unspecified type    3. Abdominal pain, unspecified abdominal location      Discussed with patient flu test is negative.  Patient states she feels better since being ill a week and a half ago but still has a residual cough Tessalon Perles has been prescribed for this symptom patient's lungs are clear without wheezing rales or rhonchi.  Discussed fullness of abdomen with abdominal discomfort with findings with KUB noted stool load in KUB film.  Patient has negative rebound pain high good bowel sounds noted.  Discussed with patient management of constipation at home  Plan:       Constipation, unspecified constipation type  -     polyethylene glycol (GLYCOLAX) 17 gram PwPk; Take 17 g by mouth once daily. for 3 days  Dispense: 3 each; Refill: 0  -     bisacodyL (DULCOLAX) 5 mg EC tablet; Take 1 tablet (5 mg total) by mouth daily as needed for Constipation.  Dispense: 3 tablet; Refill: 0    Cough, unspecified type  -     POCT Influenza A/B MOLECULAR  -     benzonatate (TESSALON) 100 MG capsule; Take 2 capsules (200 mg total) by mouth 3 (three) times daily as needed.  Dispense: 60 capsule; Refill: 0    Abdominal pain, unspecified abdominal  location  -     POCT Urinalysis(Instrument)  -     POCT urine pregnancy  -     X-Ray KUB; Future; Expected date: 02/11/2025        Patient Instructions   Discharge instructions for Constipation, Abdominal pain, Cough  Start MiraLax as prescribed 1 packet per day 3 days max  Start Dulcolax as prescribed  Tessalon Perles for cough.  Push fluids maintain hydration  Clinical reference discharge instructions printed for patient discussing cough and constipation    1) See orders for this visit as documented in the electronic medical record.  2) Symptomatic therapy suggested: use acetaminophen/ibuprofen every 6-8 hours prn pain or fever, push fluids.   3) Call or return to clinic prn if these symptoms worsen or fail to improve as anticipated.    Discussed results/diagnosis/plan with patient in clinic.  We had shared decision making for patient's treatment. Patient verbalized understanding and in agreement with current treatment plan.     Patient was instructed to return for re-evaluation with urgent care or PCP for continued outpatient workup and management if symptoms do not improve/worsening symptoms. Strict ED versus clinic precautions given in depth.    Discharge and follow-up instructions given verbally/printed with the patient who expressed understanding. The instructions and results are also available on [employer].      - You must understand that you have received an Urgent Care treatment only and that you may be released before all of your medical problems are known or treated.   - You, the patient, will arrange for follow up care as instructed.   - Follow up with your PCP or specialty clinic as directed in the next 1-2 weeks if not improved or as needed.  You can call [phone] to schedule an appointment with the appropriate provider.   - If your condition worsens or fails to improve we recommend that you receive another evaluation at the ER immediately or contact your PCP to discuss your concerns or return  here.        Lizzeth Evans, LEATHAP

## 2025-02-12 NOTE — PATIENT INSTRUCTIONS
Discharge instructions for Constipation, Abdominal pain, Cough  Start MiraLax as prescribed 1 packet per day 3 days max  Start Dulcolax as prescribed  Tessalon Perles for cough.  Push fluids maintain hydration  Clinical reference discharge instructions printed for patient discussing cough and constipation        1) See orders for this visit as documented in the electronic medical record.  2) Symptomatic therapy suggested: use acetaminophen/ibuprofen every 6-8 hours prn pain or fever, push fluids.   3) Call or return to clinic prn if these symptoms worsen or fail to improve as anticipated.    Discussed results/diagnosis/plan with patient in clinic.  We had shared decision making for patient's treatment. Patient verbalized understanding and in agreement with current treatment plan.     Patient was instructed to return for re-evaluation with urgent care or PCP for continued outpatient workup and management if symptoms do not improve/worsening symptoms. Strict ED versus clinic precautions given in depth.    Discharge and follow-up instructions given verbally/printed with the patient who expressed understanding. The instructions and results are also available on [employer].      - You must understand that you have received an Urgent Care treatment only and that you may be released before all of your medical problems are known or treated.   - You, the patient, will arrange for follow up care as instructed.   - Follow up with your PCP or specialty clinic as directed in the next 1-2 weeks if not improved or as needed.  You can call [phone] to schedule an appointment with the appropriate provider.   - If your condition worsens or fails to improve we recommend that you receive another evaluation at the ER immediately or contact your PCP to discuss your concerns or return here.        DORIS Escudero

## 2025-03-04 NOTE — ASSESSMENT & PLAN NOTE
03/06/2025 stable.  15 lb weight loss on Zepbound.  Currently on 12.5 mg has yet to start the 15 mg.  Denies side effects.  No changes, continue.  Continue working on TLC.  We discussed measurement of waist circumference as perhaps a more accurate marker of overall metabolic health  Has Provera for her PCOS if she does not experience a menstrual cycle.

## 2025-03-04 NOTE — ASSESSMENT & PLAN NOTE
03/06/2025 remote ferritin was low.  Pre visit CBC pending.  If low may need to check future ferritin

## 2025-03-04 NOTE — PROGRESS NOTES
This note was created by combination of typed  and M-Modal dictation.  Transcription errors may be present.   This note was also generated with the assistance of ambient listening technology. Verbal consent was obtained by the patient and accompanying visitor(s) for the recording of patient appointment to facilitate this note. I attest to having reviewed and edited the generated note for accuracy, though some syntax or spelling errors may persist. Please contact the author of this note for any clarification.    Assessment and Plan:   Assessment and Plan    Assessment & Plan  Normal physical exam  Screening for chlamydial disease  03/06/2025 pre visit labs drawn this a.m., results are pending.  Followed by Gynecology  Routine chlamydia screening  Future labs ordered    Orders:    C. trachomatis/N. gonorrhoeae by AMP DNA; Future    CBC Without Differential; Future    Comprehensive Metabolic Panel; Future    Lipid Panel; Future    Hemoglobin A1C; Future    Class 3 severe obesity due to excess calories without serious comorbidity with body mass index (BMI) of 40.0 to 44.9 in adult  PCOS (polycystic ovarian syndrome)  Other abnormal glucose  03/06/2025 stable.  15 lb weight loss on Zepbound.  Currently on 12.5 mg has yet to start the 15 mg.  Denies side effects.  No changes, continue.  Continue working on TLC.  We discussed measurement of waist circumference as perhaps a more accurate marker of overall metabolic health  Has Provera for her PCOS if she does not experience a menstrual cycle.         Iron deficiency anemia, unspecified iron deficiency anemia type  03/06/2025 remote ferritin was low.  Pre visit CBC pending.  If low may need to check future ferritin         Need for COVID-19 vaccine    Orders:    COVID-19 (Pfizer) 30 mcg/0.3 mL IM vaccine (>/= 13 yo) 0.3 mL        There are no discontinued medications.    meds sent this encounter:  Medications Ordered This Encounter   Medications    COVID-19  (Pfizer) 30 mcg/0.3 mL IM vaccine (>/= 11 yo) 0.3 mL         Follow Up:  Virtual visit six-month follow up on [facility]  Future Appointments   Date Time Provider Department Center   3/6/2025  8:20 AM Dino Armendariz MD [facility]         Subjective:   Subjective   Chief Complaint   Patient presents with    Annual Exam       HPI  Sherita is a 24 y.o. female.    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   Tobacco Use    Smoking status: Never    Smokeless tobacco: Never   Substance and Sexual Activity    Alcohol use: Yes     Comment: occasionally    Drug use: No    Sexual activity: Never   Social History Narrative    In 6th grade     Social Drivers of Health     Financial Resource Strain: Low Risk  (7/2/2024)    Overall Financial Resource Strain (CARDIA)     Difficulty of Paying Living Expenses: Not hard at all   Food Insecurity: No Food Insecurity (7/2/2024)    Hunger Vital Sign     Worried About Running Out of Food in the Last Year: Never true     Ran Out of Food in the Last Year: Never true   Physical Activity: Insufficiently Active (7/2/2024)    Exercise Vital Sign     Days of Exercise per Week: 1 day     Minutes of Exercise per Session: 30 min   Stress: Stress Concern Present (7/2/2024)    Citizen of [place] [employer] - Occupational Stress Questionnaire     Feeling of Stress : To some extent   Housing Stability: Unknown (7/2/2024)    Housing Stability Vital Sign     Unable to Pay for Housing in the Last Year: No       No LMP recorded.    Last appointment with this clinic was 1/6/2025. Last visit with me 1/6/2025   To summarize last visit and events leading up to today:  History of right tympanoplasty 07/06/2022.    Eustachian tube dysfunction seen by ENT.  Most recently 03/28/2024  History of anxiety.  I previously referred her to psychiatry department for  therapy/counseling.  Not on SSRI  PCOS, spironolactone.  Followed by Gynecology.  Provera.  Obesity, 07/02/2024 trial of Zepbound  1/27/25 increased dose zepbound goal of 15 mg  2/4/25 increased dose to 12.5  Migraines.  History of propranolol possible side effect drug rash.  However felt to be due to alternate etiology and plan was to resume. Cymbalta, Imitrex, Topamax  Rimegepant PRN instead of imitrex due to SE  Saw neuro 3/25 Add riboflavin and mag ox.  Rimegepant PRN  Home sleep test negative for sleep apnea    1/27/25 increased dose zepbound goal of 15 mg    2/4/25 increased dose to 12.5    2/11/25 UC for cough and constipation.     Today's visit:    History of Present Illness    SOCIAL HISTORY:  - Occupation:     HPI:  Sherita had labs done this morning. She reports inconsistent use of her prescribed medications, including Topamax and Cymbalta for migraines. She took these medications for the first time in a while last night and woke up with a migraine, which she attributes to possible dysregulation due to inconsistent use. She takes Provera once a month to trigger a menstrual cycle, but had a spontaneous menstrual cycle without taking it last time. She is currently taking ZepBound and reports a reduction in appetite with weight loss, dropping from 242 lbs to 227 lbs over the course of about a month. She reports walking for physical activity without chest pain or shortness of breath, but mentions coughing for no apparent reason during exercise. She denies seeing any blood in her urine or stool and reports no burning sensation when urinating. She denies any problems or side effects from her nasal sprays, ZepBound medication, or Provera. She denies aches or pains that limit her activities and any concerns about sexually transmitted infections.    MEDICATIONS:  - Nasal sprays, used as needed  - Topamax, for migraine prevention  - Cymbalta, for migraines and mood  - Magnesium, for health  prevention  - B12 supplement  - Provera, once a month to trigger menstrual cycle  - Spironolactone, for androgens and blood pressure  - ZepBound 12.5 mg, for appetite control and weight loss  - Topamax and Cymbalta: Not taking them regularly lately  - ZepBound: Has not started the 15 mg dose yet, still on 12.5 mg    ROS:  General: reports weight loss, reports loss of appetite  Cardiovascular: no chest pain  Respiratory: reports cough, no shortness of breath  Genitourinary: no hematuria  Musculoskeletal: no muscle pain  Neurological: reports headache         Patient Care Team:  Dino Armendariz MD as PCP - General (Internal Medicine)      Patient Active Problem List    Diagnosis Date Noted    PCOS (polycystic ovarian syndrome) 07/01/2024    Class 3 severe obesity due to excess calories without serious comorbidity with body mass index (BMI) of 40.0 to 44.9 in adult 02/06/2024    Migraine without aura and without status migrainosus, not intractable 12/08/2021    Snoring     Other headache syndrome; 8/2021 HST negative; referred to neuro 05/28/2019    Other abnormal glucose 05/28/2019    Iron deficiency anemia 09/29/2016    BMI, pediatric, 99th percentile or greater for age 09/28/2016    Hyperinsulinism 05/13/2015    Acanthosis nigricans 05/13/2015    Allergic rhinitis 11/13/2012       PAST MEDICAL PROBLEMS, PAST SURGICAL HISTORY: please see relevant portions of the electronic medical record    ALLERGIES AND MEDICATIONS: updated and reviewed.  Medication List with Changes/Refills   Current Medications    AZELASTINE (ASTELIN) 137 MCG (0.1 %) NASAL SPRAY    1 spray (137 mcg total) by Nasal route 2 (two) times daily.    CETIRIZINE (ZYRTEC) 10 MG TABLET    Take 10 mg by mouth.    DICYCLOMINE (BENTYL) 20 MG TABLET    Take 1 tablet (20 mg total) by mouth every 6 (six) hours as needed (abdominal pain).    DULOXETINE (CYMBALTA) 60 MG CAPSULE    Take 1 capsule (60 mg total) by mouth once daily.    FLUTICASONE PROPIONATE  "(FLONASE) 50 MCG/ACTUATION NASAL SPRAY    SPRAY 1 SPRAY INTO EACH NOSTRIL TWICE A DAY    MAGNESIUM OXIDE (MAG-OX) 400 MG (241.3 MG MAGNESIUM) TABLET    Take 1 tablet (400 mg total) by mouth once daily.    MEDROXYPROGESTERONE (PROVERA) 10 MG TABLET    Take one tablet by mouth x 10 days every month    MULTIVIT WITH CALCIUM,IRON,MIN (WOMEN'S DAILY MULTIVITAMIN ORAL)    Take by mouth.    ONDANSETRON (ZOFRAN-ODT) 4 MG TBDL    Take 1 tablet (4 mg total) by mouth every 6 (six) hours as needed (nausea).    RIBOFLAVIN, VITAMIN B2, 400 MG TAB    Take 400 mg by mouth once daily.    RIMEGEPANT (NURTEC) 75 MG ODT    Take 1 tablet (75 mg total) by mouth as needed for Migraine. Place ODT tablet on the tongue; alternatively the ODT tablet may be placed under the tongue. DO not exceed more than 1 tablet in 24 hrs    SPIRONOLACTONE (ALDACTONE) 100 MG TABLET    Take 1 tablet (100 mg total) by mouth once daily.    TIRZEPATIDE, WEIGHT LOSS, (ZEPBOUND) 15 MG/0.5 ML PNIJ    Inject 15 mg into the skin every 7 days.    TOPIRAMATE (TOPAMAX) 100 MG TABLET    Take 1 tablet (100 mg total) by mouth 2 (two) times daily.    TRETINOIN (RETIN-A) 0.025 % GEL    Apply small amount to entire face qhs. Wash off qam and apply sunscreen.         Objective:   Objective   Physical Exam   Vitals:    03/06/25 0832   BP: 120/64   Pulse: 97   Temp: 98.1 °F (36.7 °C)   TempSrc: Oral   SpO2: 99%   Weight: 103 kg (227 lb 2.9 oz)   Height: 5' 2" (1.575 m)    Body mass index is 41.55 kg/m².            Physical Exam  Constitutional:       Appearance: She is well-developed.   HENT:      Right Ear: Tympanic membrane, ear canal and external ear normal.      Left Ear: Tympanic membrane, ear canal and external ear normal.   Eyes:      General: No scleral icterus.     Extraocular Movements: Extraocular movements intact.      Pupils: Pupils are equal, round, and reactive to light.   Cardiovascular:      Rate and Rhythm: Normal rate and regular rhythm.      Heart sounds: " Normal heart sounds. No murmur heard.  Pulmonary:      Effort: Pulmonary effort is normal.      Breath sounds: Normal breath sounds. No wheezing.   Abdominal:      Palpations: Abdomen is soft. There is no hepatomegaly, splenomegaly or mass.      Tenderness: There is no abdominal tenderness.   Musculoskeletal:         General: No deformity. Normal range of motion.      Cervical back: Neck supple.      Right lower leg: No edema.      Left lower leg: No edema.   Lymphadenopathy:      Cervical: No cervical adenopathy.   Skin:     General: Skin is warm and dry.      Findings: No rash.      Comments: On exposed skin   Neurological:      Mental Status: She is alert and oriented to person, place, and time.      Deep Tendon Reflexes: Reflexes are normal and symmetric.   Psychiatric:         Behavior: Behavior normal.         Thought Content: Thought content normal.         Judgment: Judgment normal.

## 2025-03-06 ENCOUNTER — LAB VISIT (OUTPATIENT)
Dept: LAB | Facility: HOSPITAL | Age: 25
End: 2025-03-06
Attending: INTERNAL MEDICINE
Payer: COMMERCIAL

## 2025-03-06 ENCOUNTER — RESULTS FOLLOW-UP (OUTPATIENT)
Dept: FAMILY MEDICINE | Facility: CLINIC | Age: 25
End: 2025-03-06

## 2025-03-06 ENCOUNTER — OFFICE VISIT (OUTPATIENT)
Dept: FAMILY MEDICINE | Facility: CLINIC | Age: 25
End: 2025-03-06
Payer: COMMERCIAL

## 2025-03-06 VITALS
TEMPERATURE: 98 F | DIASTOLIC BLOOD PRESSURE: 64 MMHG | OXYGEN SATURATION: 99 % | SYSTOLIC BLOOD PRESSURE: 120 MMHG | BODY MASS INDEX: 41.81 KG/M2 | WEIGHT: 227.19 LBS | HEIGHT: 62 IN | HEART RATE: 97 BPM

## 2025-03-06 DIAGNOSIS — Z00.00 NORMAL PHYSICAL EXAM: ICD-10-CM

## 2025-03-06 DIAGNOSIS — Z23 NEED FOR COVID-19 VACCINE: ICD-10-CM

## 2025-03-06 DIAGNOSIS — Z11.8 SCREENING FOR CHLAMYDIAL DISEASE: ICD-10-CM

## 2025-03-06 DIAGNOSIS — E28.2 PCOS (POLYCYSTIC OVARIAN SYNDROME): ICD-10-CM

## 2025-03-06 DIAGNOSIS — Z11.59 NEED FOR HEPATITIS C SCREENING TEST: ICD-10-CM

## 2025-03-06 DIAGNOSIS — E66.813 CLASS 3 SEVERE OBESITY DUE TO EXCESS CALORIES WITHOUT SERIOUS COMORBIDITY WITH BODY MASS INDEX (BMI) OF 40.0 TO 44.9 IN ADULT: ICD-10-CM

## 2025-03-06 DIAGNOSIS — E66.01 CLASS 3 SEVERE OBESITY DUE TO EXCESS CALORIES WITHOUT SERIOUS COMORBIDITY WITH BODY MASS INDEX (BMI) OF 40.0 TO 44.9 IN ADULT: ICD-10-CM

## 2025-03-06 DIAGNOSIS — Z00.00 NORMAL PHYSICAL EXAM: Primary | ICD-10-CM

## 2025-03-06 DIAGNOSIS — R73.09 OTHER ABNORMAL GLUCOSE: ICD-10-CM

## 2025-03-06 DIAGNOSIS — D50.9 IRON DEFICIENCY ANEMIA, UNSPECIFIED IRON DEFICIENCY ANEMIA TYPE: Chronic | ICD-10-CM

## 2025-03-06 LAB
ALBUMIN SERPL BCP-MCNC: 3.8 G/DL (ref 3.5–5.2)
ALP SERPL-CCNC: 47 U/L (ref 40–150)
ALT SERPL W/O P-5'-P-CCNC: 19 U/L (ref 10–44)
ANION GAP SERPL CALC-SCNC: 8 MMOL/L (ref 8–16)
AST SERPL-CCNC: 19 U/L (ref 10–40)
BILIRUB SERPL-MCNC: 0.3 MG/DL (ref 0.1–1)
BUN SERPL-MCNC: 9 MG/DL (ref 6–20)
CALCIUM SERPL-MCNC: 9.3 MG/DL (ref 8.7–10.5)
CHLORIDE SERPL-SCNC: 106 MMOL/L (ref 95–110)
CHOLEST SERPL-MCNC: 146 MG/DL (ref 120–199)
CHOLEST/HDLC SERPL: 3.3 {RATIO} (ref 2–5)
CO2 SERPL-SCNC: 21 MMOL/L (ref 23–29)
CREAT SERPL-MCNC: 0.7 MG/DL (ref 0.5–1.4)
ERYTHROCYTE [DISTWIDTH] IN BLOOD BY AUTOMATED COUNT: 13.1 % (ref 11.5–14.5)
EST. GFR  (NO RACE VARIABLE): >60 ML/MIN/1.73 M^2
ESTIMATED AVG GLUCOSE: 94 MG/DL (ref 68–131)
GLUCOSE SERPL-MCNC: 85 MG/DL (ref 70–110)
HBA1C MFR BLD: 4.9 % (ref 4–5.6)
HCT VFR BLD AUTO: 40.8 % (ref 37–48.5)
HCV AB SERPL QL IA: NORMAL
HDLC SERPL-MCNC: 44 MG/DL (ref 40–75)
HDLC SERPL: 30.1 % (ref 20–50)
HGB BLD-MCNC: 13 G/DL (ref 12–16)
LDLC SERPL CALC-MCNC: 72.2 MG/DL (ref 63–159)
MCH RBC QN AUTO: 27.5 PG (ref 27–31)
MCHC RBC AUTO-ENTMCNC: 31.9 G/DL (ref 32–36)
MCV RBC AUTO: 86 FL (ref 82–98)
NONHDLC SERPL-MCNC: 102 MG/DL
PLATELET # BLD AUTO: 337 K/UL (ref 150–450)
PMV BLD AUTO: 9.9 FL (ref 9.2–12.9)
POTASSIUM SERPL-SCNC: 4.1 MMOL/L (ref 3.5–5.1)
PROT SERPL-MCNC: 7.8 G/DL (ref 6–8.4)
RBC # BLD AUTO: 4.73 M/UL (ref 4–5.4)
SODIUM SERPL-SCNC: 135 MMOL/L (ref 136–145)
TRIGL SERPL-MCNC: 149 MG/DL (ref 30–150)
TSH SERPL DL<=0.005 MIU/L-ACNC: 2.79 UIU/ML (ref 0.4–4)
WBC # BLD AUTO: 6.38 K/UL (ref 3.9–12.7)

## 2025-03-06 PROCEDURE — 83036 HEMOGLOBIN GLYCOSYLATED A1C: CPT | Performed by: INTERNAL MEDICINE

## 2025-03-06 PROCEDURE — 85027 COMPLETE CBC AUTOMATED: CPT | Performed by: INTERNAL MEDICINE

## 2025-03-06 PROCEDURE — 80061 LIPID PANEL: CPT | Performed by: INTERNAL MEDICINE

## 2025-03-06 PROCEDURE — 86803 HEPATITIS C AB TEST: CPT | Performed by: INTERNAL MEDICINE

## 2025-03-06 PROCEDURE — 3074F SYST BP LT 130 MM HG: CPT | Mod: CPTII,S$GLB,, | Performed by: INTERNAL MEDICINE

## 2025-03-06 PROCEDURE — 1159F MED LIST DOCD IN RCRD: CPT | Mod: CPTII,S$GLB,, | Performed by: INTERNAL MEDICINE

## 2025-03-06 PROCEDURE — 90480 ADMN SARSCOV2 VAC 1/ONLY CMP: CPT | Mod: S$GLB,,, | Performed by: INTERNAL MEDICINE

## 2025-03-06 PROCEDURE — 1160F RVW MEDS BY RX/DR IN RCRD: CPT | Mod: CPTII,S$GLB,, | Performed by: INTERNAL MEDICINE

## 2025-03-06 PROCEDURE — 3008F BODY MASS INDEX DOCD: CPT | Mod: CPTII,S$GLB,, | Performed by: INTERNAL MEDICINE

## 2025-03-06 PROCEDURE — 99395 PREV VISIT EST AGE 18-39: CPT | Mod: S$GLB,,, | Performed by: INTERNAL MEDICINE

## 2025-03-06 PROCEDURE — 91320 SARSCV2 VAC 30MCG TRS-SUC IM: CPT | Mod: S$GLB,,, | Performed by: INTERNAL MEDICINE

## 2025-03-06 PROCEDURE — 99999 PR PBB SHADOW E&M-EST. PATIENT-LVL V: CPT | Mod: PBBFAC,,, | Performed by: INTERNAL MEDICINE

## 2025-03-06 PROCEDURE — 87491 CHLMYD TRACH DNA AMP PROBE: CPT | Performed by: INTERNAL MEDICINE

## 2025-03-06 PROCEDURE — 84443 ASSAY THYROID STIM HORMONE: CPT | Performed by: INTERNAL MEDICINE

## 2025-03-06 PROCEDURE — 80053 COMPREHEN METABOLIC PANEL: CPT | Performed by: INTERNAL MEDICINE

## 2025-03-06 PROCEDURE — 3078F DIAST BP <80 MM HG: CPT | Mod: CPTII,S$GLB,, | Performed by: INTERNAL MEDICINE

## 2025-03-07 LAB
C TRACH DNA SPEC QL NAA+PROBE: NOT DETECTED
N GONORRHOEA DNA SPEC QL NAA+PROBE: NOT DETECTED

## 2025-03-07 NOTE — PROGRESS NOTES
CBC WNL  CMP WNL  HCV neg  TSH WNL  A1c WNL  Lipid WNL    Results to pt via [facility]. No changes.  VV 6 months.

## 2025-03-09 ENCOUNTER — RESULTS FOLLOW-UP (OUTPATIENT)
Dept: FAMILY MEDICINE | Facility: CLINIC | Age: 25
End: 2025-03-09

## 2025-04-27 DIAGNOSIS — G43.009 MIGRAINE WITHOUT AURA AND WITHOUT STATUS MIGRAINOSUS, NOT INTRACTABLE: ICD-10-CM

## 2025-04-28 RX ORDER — TOPIRAMATE 100 MG/1
100 TABLET, FILM COATED ORAL 2 TIMES DAILY
Qty: 180 TABLET | Refills: 3 | Status: SHIPPED | OUTPATIENT
Start: 2025-04-28 | End: 2026-04-28

## 2025-06-18 RX ORDER — SPIRONOLACTONE 100 MG/1
100 TABLET, FILM COATED ORAL DAILY
Qty: 90 TABLET | Refills: 0 | Status: SHIPPED | OUTPATIENT
Start: 2025-06-18

## 2025-06-18 NOTE — TELEPHONE ENCOUNTER
Refill Decision Note   Sherita Alejandra  is requesting a refill authorization.  Brief Assessment and Rationale for Refill:  Approve     Medication Therapy Plan:         Comments:     Note composed:11:07 AM 06/18/2025

## 2025-06-19 RX ORDER — MEDROXYPROGESTERONE ACETATE 10 MG/1
TABLET ORAL
Qty: 30 TABLET | Refills: 0 | Status: SHIPPED | OUTPATIENT
Start: 2025-06-19

## 2025-06-19 NOTE — TELEPHONE ENCOUNTER
Refill Decision Note   Sherita Alejandra  is requesting a refill authorization.  Brief Assessment and Rationale for Refill:  Approve     Medication Therapy Plan:         Comments:     Note composed:6:35 AM 06/19/2025

## 2025-06-23 ENCOUNTER — PATIENT MESSAGE (OUTPATIENT)
Dept: FAMILY MEDICINE | Facility: CLINIC | Age: 25
End: 2025-06-23
Payer: COMMERCIAL

## 2025-06-23 DIAGNOSIS — E66.813 CLASS 3 SEVERE OBESITY DUE TO EXCESS CALORIES WITHOUT SERIOUS COMORBIDITY WITH BODY MASS INDEX (BMI) OF 40.0 TO 44.9 IN ADULT: ICD-10-CM

## 2025-06-23 DIAGNOSIS — E66.01 CLASS 3 SEVERE OBESITY DUE TO EXCESS CALORIES WITHOUT SERIOUS COMORBIDITY WITH BODY MASS INDEX (BMI) OF 40.0 TO 44.9 IN ADULT: ICD-10-CM

## 2025-06-23 RX ORDER — TIRZEPATIDE 15 MG/.5ML
15 INJECTION, SOLUTION SUBCUTANEOUS
Qty: 12 PEN | Refills: 0 | Status: SHIPPED | OUTPATIENT
Start: 2025-06-23

## 2025-06-23 NOTE — TELEPHONE ENCOUNTER
Care Due:                  Date            Visit Type   Department     Provider  --------------------------------------------------------------------------------                                EP -         [facility]                              PRIMARY      / INTERNAL MED  Last Visit: 03-      CARE ([facility])   / KIKI GARCIA   [facility]                              PATIENT -    / INTERNAL MED  Next Visit: 09-      VIRTUAL      / KIKI Armendariz                                                            Last  Test          Frequency    Reason                     Performed    Due Date  --------------------------------------------------------------------------------    HBA1C.......  6 months...  tirzepatide,.............  03- 09-    [employer] Embedded Care Due Messages. Reference number: [id number].   6/23/2025 2:29:56 PM CDT

## 2025-06-23 NOTE — TELEPHONE ENCOUNTER
Provider Staff:  Action required for this patient    Requires labs - A1c     Please see care gap opportunities below in Care Due Message.    Thanks!  [facility]     Appointments      Date Provider   Last Visit   3/6/2025 Dino Armendariz MD   Next Visit   9/8/2025 Dino Armendariz MD     Refill Decision Note   Sherita Alejandra  is requesting a refill authorization.  Brief Assessment and Rationale for Refill:  Approve     Medication Therapy Plan:        Comments:     Note composed:3:52 PM 06/23/2025

## 2025-07-02 NOTE — ASSESSMENT & PLAN NOTE
Patient awake and states she has a headache. States at home she would take Tylenol and it usually helps. CRNA stops by to see patient. He is informed of headache and he states that we could give her some Tylenol.  Patient states that she would wait until she gets home and take some then.    Recheck labs.  Will discuss further at follow up

## 2025-07-24 ENCOUNTER — PATIENT MESSAGE (OUTPATIENT)
Dept: FAMILY MEDICINE | Facility: CLINIC | Age: 25
End: 2025-07-24
Payer: COMMERCIAL

## 2025-07-24 DIAGNOSIS — E66.813 CLASS 3 SEVERE OBESITY DUE TO EXCESS CALORIES WITHOUT SERIOUS COMORBIDITY WITH BODY MASS INDEX (BMI) OF 40.0 TO 44.9 IN ADULT: Primary | ICD-10-CM

## 2025-07-24 RX ORDER — SEMAGLUTIDE 2.4 MG/.75ML
2.4 INJECTION, SOLUTION SUBCUTANEOUS
Qty: 3 ML | Refills: 11 | Status: SHIPPED | OUTPATIENT
Start: 2025-07-24

## (undated) DEVICE — SUCTION SURGICAL FRAZR

## (undated) DEVICE — SUT 3/0 18IN COATED VICRYLP

## (undated) DEVICE — BLADE SURG CARBON STEEL SZ11

## (undated) DEVICE — COTTONBALL LG ST

## (undated) DEVICE — KIT SUCTION IRRIGATION

## (undated) DEVICE — CLOSURE SKIN STERI STRIP 1/2X4

## (undated) DEVICE — KIT EAR EAOFE

## (undated) DEVICE — DRAPE MICROSCOPE OPMI

## (undated) DEVICE — SOL IRR WATER STRL 3000 ML

## (undated) DEVICE — SEE MEDLINE ITEM 154981

## (undated) DEVICE — PAD CUSTOM COTTON 2 X 2

## (undated) DEVICE — BLADE SCALP BEAVER 2.5MM ANG

## (undated) DEVICE — SEE MEDLINE ITEM 157128

## (undated) DEVICE — DRAPE STERI 32 X 50

## (undated) DEVICE — KIT DRESS GLASSCK EAR ADLT ST

## (undated) DEVICE — DRAPE SURGICAL STERI IRRG PCH

## (undated) DEVICE — SEE MEDLINE ITEM 147518

## (undated) DEVICE — BLADE SCALP OPTHL NDL TIP 3MM

## (undated) DEVICE — ELECTRODE REM PLYHSV RETURN 9

## (undated) DEVICE — BLADE SCALP OPHTL RND TIP

## (undated) DEVICE — CLIPPER BLADE MOD 4406 (CAREF)

## (undated) DEVICE — BLADE OTOLOGY BEAVER 5X84MM

## (undated) DEVICE — BLADE TONGUE DEPRESSOR STRL

## (undated) DEVICE — HUM BUBBLE 350ML 0-6L

## (undated) DEVICE — SUT CTD VICRYL 3-0 PS-2 UND

## (undated) DEVICE — SYR 3CC LUER LOC

## (undated) DEVICE — SOL IRR NACL .9% 3000ML

## (undated) DEVICE — SUT VICRYL 3-0 27 SH

## (undated) DEVICE — SEE MEDLINE ITEM 157103

## (undated) DEVICE — SUT BONE WAX 2.5 GRMS 12/BX

## (undated) DEVICE — TOWEL OR DISP STRL BLUE 4/PK